# Patient Record
Sex: MALE | Race: WHITE | Employment: OTHER | URBAN - METROPOLITAN AREA
[De-identification: names, ages, dates, MRNs, and addresses within clinical notes are randomized per-mention and may not be internally consistent; named-entity substitution may affect disease eponyms.]

---

## 2017-01-27 RX ORDER — TRAMADOL HYDROCHLORIDE 50 MG/1
50 TABLET ORAL EVERY 6 HOURS PRN
Qty: 120 TABLET | Refills: 1 | OUTPATIENT
Start: 2017-01-27

## 2017-02-14 RX ORDER — TRAMADOL HYDROCHLORIDE 50 MG/1
50 TABLET ORAL EVERY 6 HOURS PRN
Qty: 120 TABLET | Refills: 0 | Status: SHIPPED | OUTPATIENT
Start: 2017-02-14 | End: 2017-04-13 | Stop reason: SDUPTHER

## 2017-04-13 ENCOUNTER — OFFICE VISIT (OUTPATIENT)
Dept: FAMILY MEDICINE CLINIC | Age: 67
End: 2017-04-13

## 2017-04-13 VITALS
WEIGHT: 315 LBS | HEIGHT: 68 IN | TEMPERATURE: 98.4 F | OXYGEN SATURATION: 97 % | BODY MASS INDEX: 47.74 KG/M2 | SYSTOLIC BLOOD PRESSURE: 140 MMHG | DIASTOLIC BLOOD PRESSURE: 82 MMHG | HEART RATE: 79 BPM | RESPIRATION RATE: 18 BRPM

## 2017-04-13 DIAGNOSIS — E66.01 MORBID OBESITY DUE TO EXCESS CALORIES (HCC): ICD-10-CM

## 2017-04-13 DIAGNOSIS — M17.0 PRIMARY OSTEOARTHRITIS OF BOTH KNEES: ICD-10-CM

## 2017-04-13 DIAGNOSIS — J30.2 SEASONAL ALLERGIC RHINITIS, UNSPECIFIED ALLERGIC RHINITIS TRIGGER: ICD-10-CM

## 2017-04-13 DIAGNOSIS — E78.2 MIXED HYPERLIPIDEMIA: ICD-10-CM

## 2017-04-13 DIAGNOSIS — I10 ESSENTIAL HYPERTENSION: Primary | ICD-10-CM

## 2017-04-13 DIAGNOSIS — K58.9 IRRITABLE BOWEL SYNDROME WITHOUT DIARRHEA: ICD-10-CM

## 2017-04-13 DIAGNOSIS — E66.01 MORBID OBESITY WITH BMI OF 50.0-59.9, ADULT (HCC): ICD-10-CM

## 2017-04-13 DIAGNOSIS — D64.9 ANEMIA, UNSPECIFIED TYPE: ICD-10-CM

## 2017-04-13 PROCEDURE — 99214 OFFICE O/P EST MOD 30 MIN: CPT | Performed by: FAMILY MEDICINE

## 2017-04-13 PROCEDURE — 3017F COLORECTAL CA SCREEN DOC REV: CPT | Performed by: FAMILY MEDICINE

## 2017-04-13 PROCEDURE — 1123F ACP DISCUSS/DSCN MKR DOCD: CPT | Performed by: FAMILY MEDICINE

## 2017-04-13 PROCEDURE — 90670 PCV13 VACCINE IM: CPT | Performed by: FAMILY MEDICINE

## 2017-04-13 PROCEDURE — 4040F PNEUMOC VAC/ADMIN/RCVD: CPT | Performed by: FAMILY MEDICINE

## 2017-04-13 PROCEDURE — G0009 ADMIN PNEUMOCOCCAL VACCINE: HCPCS | Performed by: FAMILY MEDICINE

## 2017-04-13 PROCEDURE — G8417 CALC BMI ABV UP PARAM F/U: HCPCS | Performed by: FAMILY MEDICINE

## 2017-04-13 PROCEDURE — 1036F TOBACCO NON-USER: CPT | Performed by: FAMILY MEDICINE

## 2017-04-13 PROCEDURE — G8427 DOCREV CUR MEDS BY ELIG CLIN: HCPCS | Performed by: FAMILY MEDICINE

## 2017-04-13 RX ORDER — CYCLOBENZAPRINE HCL 10 MG
TABLET ORAL
Qty: 180 TABLET | Refills: 1 | Status: SHIPPED | OUTPATIENT
Start: 2017-04-13 | End: 2017-10-19 | Stop reason: SDUPTHER

## 2017-04-13 RX ORDER — IRBESARTAN 150 MG/1
150 TABLET ORAL DAILY
Qty: 90 TABLET | Refills: 3 | Status: SHIPPED | OUTPATIENT
Start: 2017-04-13 | End: 2018-04-12 | Stop reason: SDUPTHER

## 2017-04-13 RX ORDER — TERAZOSIN 10 MG/1
10 CAPSULE ORAL DAILY
Qty: 90 CAPSULE | Refills: 3 | Status: SHIPPED | OUTPATIENT
Start: 2017-04-13 | End: 2018-05-01 | Stop reason: SDUPTHER

## 2017-04-13 RX ORDER — CYCLOBENZAPRINE HCL 10 MG
TABLET ORAL
Qty: 180 TABLET | Refills: 1 | Status: SHIPPED | OUTPATIENT
Start: 2017-04-13 | End: 2017-04-13

## 2017-04-13 RX ORDER — TRAMADOL HYDROCHLORIDE 50 MG/1
50 TABLET ORAL EVERY 6 HOURS PRN
Qty: 120 TABLET | Refills: 0 | Status: SHIPPED | OUTPATIENT
Start: 2017-04-13 | End: 2017-10-19 | Stop reason: SDUPTHER

## 2017-04-13 RX ORDER — TERAZOSIN 10 MG/1
10 CAPSULE ORAL DAILY
Qty: 90 CAPSULE | Refills: 3 | Status: SHIPPED | OUTPATIENT
Start: 2017-04-13 | End: 2017-04-13

## 2017-04-13 ASSESSMENT — PATIENT HEALTH QUESTIONNAIRE - PHQ9
SUM OF ALL RESPONSES TO PHQ QUESTIONS 1-9: 0
1. LITTLE INTEREST OR PLEASURE IN DOING THINGS: 0
2. FEELING DOWN, DEPRESSED OR HOPELESS: 0
SUM OF ALL RESPONSES TO PHQ9 QUESTIONS 1 & 2: 0

## 2017-04-27 ENCOUNTER — CARE COORDINATION (OUTPATIENT)
Dept: CARE COORDINATION | Age: 67
End: 2017-04-27

## 2017-10-09 DIAGNOSIS — E78.2 MIXED HYPERLIPIDEMIA: ICD-10-CM

## 2017-10-09 DIAGNOSIS — I10 ESSENTIAL HYPERTENSION: ICD-10-CM

## 2017-10-09 DIAGNOSIS — D64.9 ANEMIA, UNSPECIFIED TYPE: ICD-10-CM

## 2017-10-09 DIAGNOSIS — I10 ESSENTIAL HYPERTENSION: Primary | ICD-10-CM

## 2017-10-09 LAB
ALBUMIN SERPL-MCNC: 4.2 G/DL (ref 3.9–4.9)
ALP BLD-CCNC: 69 U/L (ref 35–104)
ALT SERPL-CCNC: 17 U/L (ref 0–41)
ANION GAP SERPL CALCULATED.3IONS-SCNC: 19 MEQ/L (ref 7–13)
AST SERPL-CCNC: 17 U/L (ref 0–40)
BASOPHILS ABSOLUTE: 0.1 K/UL (ref 0–0.2)
BASOPHILS RELATIVE PERCENT: 1 %
BILIRUB SERPL-MCNC: 0.5 MG/DL (ref 0–1.2)
BUN BLDV-MCNC: 13 MG/DL (ref 8–23)
CALCIUM SERPL-MCNC: 8.9 MG/DL (ref 8.6–10.2)
CHLORIDE BLD-SCNC: 98 MEQ/L (ref 98–107)
CHOLESTEROL, TOTAL: 179 MG/DL (ref 0–199)
CO2: 24 MEQ/L (ref 22–29)
CREAT SERPL-MCNC: 0.74 MG/DL (ref 0.7–1.2)
EOSINOPHILS ABSOLUTE: 0.5 K/UL (ref 0–0.7)
EOSINOPHILS RELATIVE PERCENT: 8.3 %
GFR AFRICAN AMERICAN: >60
GFR NON-AFRICAN AMERICAN: >60
GLOBULIN: 3.2 G/DL (ref 2.3–3.5)
GLUCOSE BLD-MCNC: 90 MG/DL (ref 74–109)
HCT VFR BLD CALC: 39.9 % (ref 42–52)
HDLC SERPL-MCNC: 36 MG/DL (ref 40–59)
HEMOGLOBIN: 13.1 G/DL (ref 14–18)
LDL CHOLESTEROL CALCULATED: 116 MG/DL (ref 0–129)
LYMPHOCYTES ABSOLUTE: 1.9 K/UL (ref 1–4.8)
LYMPHOCYTES RELATIVE PERCENT: 29 %
MCH RBC QN AUTO: 33.2 PG (ref 27–31.3)
MCHC RBC AUTO-ENTMCNC: 32.7 % (ref 33–37)
MCV RBC AUTO: 101.3 FL (ref 80–100)
MONOCYTES ABSOLUTE: 0.7 K/UL (ref 0.2–0.8)
MONOCYTES RELATIVE PERCENT: 10.2 %
NEUTROPHILS ABSOLUTE: 3.4 K/UL (ref 1.4–6.5)
NEUTROPHILS RELATIVE PERCENT: 51.5 %
PDW BLD-RTO: 14.4 % (ref 11.5–14.5)
PLATELET # BLD: 226 K/UL (ref 130–400)
POTASSIUM SERPL-SCNC: 3.8 MEQ/L (ref 3.5–5.1)
RBC # BLD: 3.94 M/UL (ref 4.7–6.1)
SODIUM BLD-SCNC: 141 MEQ/L (ref 132–144)
TOTAL PROTEIN: 7.4 G/DL (ref 6.4–8.1)
TRIGL SERPL-MCNC: 135 MG/DL (ref 0–200)
WBC # BLD: 6.6 K/UL (ref 4.8–10.8)

## 2017-10-19 ENCOUNTER — OFFICE VISIT (OUTPATIENT)
Dept: FAMILY MEDICINE CLINIC | Age: 67
End: 2017-10-19

## 2017-10-19 VITALS
OXYGEN SATURATION: 97 % | BODY MASS INDEX: 47.74 KG/M2 | HEART RATE: 87 BPM | TEMPERATURE: 97.5 F | WEIGHT: 315 LBS | RESPIRATION RATE: 18 BRPM | DIASTOLIC BLOOD PRESSURE: 70 MMHG | HEIGHT: 68 IN | SYSTOLIC BLOOD PRESSURE: 130 MMHG

## 2017-10-19 DIAGNOSIS — E66.01 MORBID OBESITY DUE TO EXCESS CALORIES (HCC): ICD-10-CM

## 2017-10-19 DIAGNOSIS — K58.9 IRRITABLE BOWEL SYNDROME WITHOUT DIARRHEA: ICD-10-CM

## 2017-10-19 DIAGNOSIS — D64.9 ANEMIA, UNSPECIFIED TYPE: ICD-10-CM

## 2017-10-19 DIAGNOSIS — I10 ESSENTIAL HYPERTENSION: ICD-10-CM

## 2017-10-19 DIAGNOSIS — E78.2 MIXED HYPERLIPIDEMIA: ICD-10-CM

## 2017-10-19 DIAGNOSIS — J30.2 CHRONIC SEASONAL ALLERGIC RHINITIS, UNSPECIFIED TRIGGER: Primary | ICD-10-CM

## 2017-10-19 PROCEDURE — G8417 CALC BMI ABV UP PARAM F/U: HCPCS | Performed by: FAMILY MEDICINE

## 2017-10-19 PROCEDURE — G8484 FLU IMMUNIZE NO ADMIN: HCPCS | Performed by: FAMILY MEDICINE

## 2017-10-19 PROCEDURE — 1123F ACP DISCUSS/DSCN MKR DOCD: CPT | Performed by: FAMILY MEDICINE

## 2017-10-19 PROCEDURE — 90662 IIV NO PRSV INCREASED AG IM: CPT | Performed by: FAMILY MEDICINE

## 2017-10-19 PROCEDURE — G8427 DOCREV CUR MEDS BY ELIG CLIN: HCPCS | Performed by: FAMILY MEDICINE

## 2017-10-19 PROCEDURE — 99214 OFFICE O/P EST MOD 30 MIN: CPT | Performed by: FAMILY MEDICINE

## 2017-10-19 PROCEDURE — 4040F PNEUMOC VAC/ADMIN/RCVD: CPT | Performed by: FAMILY MEDICINE

## 2017-10-19 PROCEDURE — 3017F COLORECTAL CA SCREEN DOC REV: CPT | Performed by: FAMILY MEDICINE

## 2017-10-19 PROCEDURE — 1036F TOBACCO NON-USER: CPT | Performed by: FAMILY MEDICINE

## 2017-10-19 PROCEDURE — G0008 ADMIN INFLUENZA VIRUS VAC: HCPCS | Performed by: FAMILY MEDICINE

## 2017-10-19 RX ORDER — AMOXICILLIN 500 MG/1
1000 TABLET, FILM COATED ORAL 2 TIMES DAILY
Qty: 40 TABLET | Refills: 0 | Status: SHIPPED | OUTPATIENT
Start: 2017-10-19 | End: 2017-10-19

## 2017-10-19 RX ORDER — CYCLOBENZAPRINE HCL 10 MG
TABLET ORAL
Qty: 180 TABLET | Refills: 1 | Status: SHIPPED | OUTPATIENT
Start: 2017-10-19 | End: 2018-05-01 | Stop reason: SDUPTHER

## 2017-10-19 RX ORDER — AMOXICILLIN 500 MG/1
1000 TABLET, FILM COATED ORAL 2 TIMES DAILY
Qty: 40 TABLET | Refills: 0 | Status: SHIPPED | OUTPATIENT
Start: 2017-10-19 | End: 2018-02-22 | Stop reason: ALTCHOICE

## 2017-10-19 RX ORDER — TRAMADOL HYDROCHLORIDE 50 MG/1
50 TABLET ORAL EVERY 6 HOURS PRN
Qty: 120 TABLET | Refills: 0 | Status: SHIPPED | OUTPATIENT
Start: 2017-10-19 | End: 2017-12-10 | Stop reason: SDUPTHER

## 2017-10-19 NOTE — PATIENT INSTRUCTIONS
slowly. Try to make mealtime relaxing. · Find ways to reduce stress. · Get at least 30 minutes of exercise on most days of the week. Exercise can help reduce tension and prevent constipation. Walking is a good choice. You also may want to do other activities, such as running, swimming, cycling, or playing tennis or team sports. When should you call for help? Call your doctor now or seek immediate medical care if:  · Your pain is different than usual or occurs with fever. · You lose weight without trying, or you lose your appetite and you do not know why. · Your symptoms often wake you from sleep. · Your stools are black and tarlike or have streaks of blood. Watch closely for changes in your health, and be sure to contact your doctor if:  · Your IBS symptoms get worse or begin to disrupt your day-to-day life. · You become more tired than usual.  · Your home treatment stops working. Where can you learn more? Go to https://Apokalyyis.PCD Partners. org and sign in to your Meggatel account. Enter P138 in the Photo Rankr box to learn more about \"Irritable Bowel Syndrome: Care Instructions. \"     If you do not have an account, please click on the \"Sign Up Now\" link. Current as of: August 9, 2016  Content Version: 11.3  © 7782-0030 [employer]. Care instructions adapted under license by [facility] ([facility]). If you have questions about a medical condition or this instruction, always ask your healthcare professional. [place] [address] any warranty or liability for your use of this information.

## 2017-10-19 NOTE — PROGRESS NOTES
Vaccine Information Sheet, \"Influenza - Inactivated\" OR \"Live - Intranasal\"  given to [employer]. Patient responses:    Have you ever had a reaction to a flu vaccine? Yes  Are you able to eat eggs without adverse effects? Yes  Do you have any current illness? No  Have you ever had Guillian Alsip Syndrome? No    Flu vaccine given per order. Please see immunization tab.     DR. Nieto 57 A FLU SHOT
bilateral TM's and external ear canals normal  Mouth - mucous membranes moist, pharynx normal without lesions  Neck - supple, no significant adenopathy  Lymphatics - no palpable lymphadenopathy, no hepatosplenomegaly  Chest - clear to auscultation, no wheezes, rales or rhonchi, symmetric air entry  Heart - normal rate, regular rhythm, normal S1, S2, no murmurs, rubs, clicks or gallops  Abdomen - soft, nontender, nondistended, no masses or organomegaly, obese  Neurological - alert, oriented, normal speech, no focal findings or movement disorder noted  Musculoskeletal - no joint tenderness, deformity or swelling  Extremities - peripheral pulses normal, no pedal edema, no clubbing or cyanosis  Skin - normal coloration and turgor, no rashes, no suspicious skin lesions noted;    Assessment:    1. Chronic seasonal allergic rhinitis, unspecified trigger     2. Essential hypertension     3. Morbid obesity due to excess calories ([facility] [address]     4. Irritable bowel syndrome without diarrhea     5. Anemia, unspecified type     6.  Mixed hyperlipidemia         Plan:    Orders Placed This Encounter   Procedures    INFLUENZA, HIGH DOSE, 65 YRS +, IM, PF, PREFILL SYR, 0.5ML (FLUZONE HD)       Outpatient Encounter Prescriptions as of 10/19/2017   Medication Sig Dispense Refill    traMADol (ULTRAM) 50 MG tablet Take 1 tablet by mouth every 6 hours as needed for Pain 120 tablet 0    cyclobenzaprine (FLEXERIL) 10 MG tablet TAKE 1 TABLET EVERY 8 HOURS AS NEEDED FOR MUSCLE SPASMS 180 tablet 1    Amoxicillin 500 MG TABS Take 1,000 mg by mouth 2 times daily 40 tablet 0    irbesartan (AVAPRO) 150 MG tablet Take 1 tablet by mouth daily 90 tablet 3    terazosin (HYTRIN) 10 MG capsule Take 1 capsule by mouth daily 90 capsule 3    [DISCONTINUED] Amoxicillin 500 MG TABS Take 1,000 mg by mouth 2 times daily 40 tablet 0    [DISCONTINUED] traMADol (ULTRAM) 50 MG tablet Take 1 tablet by mouth every 6 hours as needed for Pain 120 tablet 0   
<<-----Click here for Discharge Medication Review

## 2017-12-10 NOTE — TELEPHONE ENCOUNTER
From: Maggie Cruz  Sent: 12/9/2017 5:00 PM EST  Subject: Medication Renewal Request    Idania Cruz would like a refill of the following medications:  traMADol (ULTRAM) 50 MG tablet Jeb Conn MD]    Preferred pharmacy: [address] [place] [address] , [address] [facility] [phone] [facility] [phone]    Comment:

## 2017-12-11 RX ORDER — TRAMADOL HYDROCHLORIDE 50 MG/1
50 TABLET ORAL EVERY 6 HOURS PRN
Qty: 120 TABLET | Refills: 0 | Status: SHIPPED | OUTPATIENT
Start: 2017-12-11 | End: 2018-05-01 | Stop reason: SDUPTHER

## 2018-01-17 ENCOUNTER — TELEPHONE (OUTPATIENT)
Dept: FAMILY MEDICINE CLINIC | Age: 68
End: 2018-01-17

## 2018-01-29 ENCOUNTER — TELEPHONE (OUTPATIENT)
Dept: FAMILY MEDICINE CLINIC | Age: 68
End: 2018-01-29

## 2018-01-29 NOTE — TELEPHONE ENCOUNTER
Received a PA form for Cyclobenzaprine HCL  from [employer]. Completed form and put on doctor's desk for signature. Once singed needs faxed back to insurance company.

## 2018-02-22 ENCOUNTER — OFFICE VISIT (OUTPATIENT)
Dept: FAMILY MEDICINE CLINIC | Age: 68
End: 2018-02-22
Payer: MEDICARE

## 2018-02-22 VITALS
HEIGHT: 68 IN | DIASTOLIC BLOOD PRESSURE: 86 MMHG | HEART RATE: 104 BPM | WEIGHT: 315 LBS | RESPIRATION RATE: 20 BRPM | TEMPERATURE: 97.8 F | BODY MASS INDEX: 47.74 KG/M2 | SYSTOLIC BLOOD PRESSURE: 134 MMHG

## 2018-02-22 DIAGNOSIS — B96.89 ACUTE BACTERIAL SINUSITIS: Primary | ICD-10-CM

## 2018-02-22 DIAGNOSIS — J01.90 ACUTE BACTERIAL SINUSITIS: Primary | ICD-10-CM

## 2018-02-22 DIAGNOSIS — E66.01 MORBID OBESITY WITH BMI OF 60.0-69.9, ADULT (HCC): ICD-10-CM

## 2018-02-22 PROCEDURE — 99213 OFFICE O/P EST LOW 20 MIN: CPT | Performed by: FAMILY MEDICINE

## 2018-02-22 PROCEDURE — 1036F TOBACCO NON-USER: CPT | Performed by: FAMILY MEDICINE

## 2018-02-22 PROCEDURE — 4040F PNEUMOC VAC/ADMIN/RCVD: CPT | Performed by: FAMILY MEDICINE

## 2018-02-22 PROCEDURE — G8417 CALC BMI ABV UP PARAM F/U: HCPCS | Performed by: FAMILY MEDICINE

## 2018-02-22 PROCEDURE — 1123F ACP DISCUSS/DSCN MKR DOCD: CPT | Performed by: FAMILY MEDICINE

## 2018-02-22 PROCEDURE — G8484 FLU IMMUNIZE NO ADMIN: HCPCS | Performed by: FAMILY MEDICINE

## 2018-02-22 PROCEDURE — 3017F COLORECTAL CA SCREEN DOC REV: CPT | Performed by: FAMILY MEDICINE

## 2018-02-22 PROCEDURE — G8427 DOCREV CUR MEDS BY ELIG CLIN: HCPCS | Performed by: FAMILY MEDICINE

## 2018-02-22 RX ORDER — AZITHROMYCIN 250 MG/1
TABLET, FILM COATED ORAL
Qty: 1 PACKET | Refills: 0 | Status: SHIPPED | OUTPATIENT
Start: 2018-02-22 | End: 2018-03-04

## 2018-02-22 NOTE — PATIENT INSTRUCTIONS
solution, insert the tip into your nostril, and squeeze gently. Elliot Beech your nose. When should you call for help? Call your doctor now or seek immediate medical care if:  ? · You are having trouble breathing. ? Watch closely for changes in your health, and be sure to contact your doctor if:  ? · Mucus from your nose gets thicker (like pus) or has new blood in it. ? · You have new or worse symptoms. ? · You do not get better as expected. Where can you learn more? Go to https://[email or web address] [email or web address] Tag.[email or web address] org and sign in to your UpCity account. Enter M030 in the [employer] box to learn more about \"Rhinitis: Care Instructions. \"     If you do not have an account, please click on the \"Sign Up Now\" link. Current as of: May 12, 2017  Content Version: 11.5  © 1625-6795 Healthwise, Incorporated. Care instructions adapted under license by [facility] ([facility]). If you have questions about a medical condition or this instruction, always ask your healthcare professional. [address] any warranty or liability for your use of this information.        Patient Education

## 2018-02-22 NOTE — PROGRESS NOTES
Chief Complaint   Patient presents with    Sinusitis     x6 weeks     Chest Congestion     productive of yellow or clear phlem      2 weeks  head cold and sinus  some cough  no n/v/d  others ill  no fever  Surgical and family history reviewed, medications and allergies noted. Patient denies headache, chest pain, shortness of breath, abdominal pain, nausea, vomiting, fever, weight loss, hematochezia, hematemasis, melena, dysuria, lymphadenopathy, thyromegaly, seizures, rash. OBJECTIVE:  Vitals:    02/22/18 1041   BP: 134/86   Pulse: 104   Resp: 20   Temp: 97.8 °F (36.6 °C)       Vital signs noted. Well appearing in no distress. HEENT  Exam PERRLA, EOMI, TM nl bilaterally, nose clear, throat clear, airway patent, neck supple w/o lymphadenopathy, thyromegaly or meningismus, lungs CTA, CV RRRw/o murmur, gallop or rub, abdomen soft, nontender, positive bowel sounds, w/o mass or hepatosplenomegaly, skin w/o rash, neurologically intact w/o deficit, nl DTR, patient alert and oriented x3, Ext. Full range of motion w/o clubbing, cyanosis or edema, well hydrated. ASSESSMENT:    Encounter Diagnoses   Name Primary?  Acute bacterial sinusitis Yes    Morbid obesity with BMI of 60.0-69.9, adult ([facility])        PLAN:    No orders of the defined types were placed in this encounter. Outpatient Encounter Prescriptions as of 2/22/2018   Medication Sig Dispense Refill    azithromycin (ZITHROMAX) 250 MG tablet Take 2 tabs (500 mg) on Day 1, and take 1 tab (250 mg) on days 2 through 5. 1 packet 0    traMADol (ULTRAM) 50 MG tablet Take 1 tablet by mouth every 6 hours as needed for Pain .  120 tablet 0    cyclobenzaprine (FLEXERIL) 10 MG tablet TAKE 1 TABLET EVERY 8 HOURS AS NEEDED FOR MUSCLE SPASMS 180 tablet 1    irbesartan (AVAPRO) 150 MG tablet Take 1 tablet by mouth daily 90 tablet 3    terazosin (HYTRIN) 10 MG capsule Take 1 capsule by mouth daily 90 capsule 3    [DISCONTINUED] Amoxicillin 500 MG TABS Take 1,000 mg

## 2018-04-16 RX ORDER — IRBESARTAN 150 MG/1
TABLET ORAL
Qty: 90 TABLET | Refills: 3 | Status: SHIPPED | OUTPATIENT
Start: 2018-04-16 | End: 2018-05-15

## 2018-04-26 ENCOUNTER — OFFICE VISIT (OUTPATIENT)
Dept: CARDIOLOGY CLINIC | Age: 68
End: 2018-04-26
Payer: MEDICARE

## 2018-04-26 ENCOUNTER — HOSPITAL ENCOUNTER (OUTPATIENT)
Dept: GENERAL RADIOLOGY | Age: 68
Discharge: HOME | End: 2018-04-28
Payer: MEDICARE

## 2018-04-26 ENCOUNTER — OFFICE VISIT (OUTPATIENT)
Dept: FAMILY MEDICINE CLINIC | Age: 68
End: 2018-04-26
Payer: MEDICARE

## 2018-04-26 VITALS
HEART RATE: 80 BPM | HEIGHT: 68 IN | DIASTOLIC BLOOD PRESSURE: 86 MMHG | SYSTOLIC BLOOD PRESSURE: 138 MMHG | OXYGEN SATURATION: 97 %

## 2018-04-26 VITALS
SYSTOLIC BLOOD PRESSURE: 138 MMHG | OXYGEN SATURATION: 94 % | DIASTOLIC BLOOD PRESSURE: 88 MMHG | HEART RATE: 103 BPM | RESPIRATION RATE: 22 BRPM | TEMPERATURE: 98.4 F

## 2018-04-26 DIAGNOSIS — R06.02 SOB (SHORTNESS OF BREATH): Primary | ICD-10-CM

## 2018-04-26 DIAGNOSIS — E66.01 MORBID OBESITY DUE TO EXCESS CALORIES (HCC): Primary | ICD-10-CM

## 2018-04-26 DIAGNOSIS — R01.1 CARDIAC MURMUR: ICD-10-CM

## 2018-04-26 DIAGNOSIS — I48.19 PERSISTENT ATRIAL FIBRILLATION (HCC): ICD-10-CM

## 2018-04-26 DIAGNOSIS — R01.1 HEART MURMUR: ICD-10-CM

## 2018-04-26 DIAGNOSIS — E78.2 MIXED HYPERLIPIDEMIA: ICD-10-CM

## 2018-04-26 DIAGNOSIS — R06.01 ORTHOPNEA: ICD-10-CM

## 2018-04-26 DIAGNOSIS — J18.9 PNEUMONIA OF RIGHT LOWER LOBE DUE TO INFECTIOUS ORGANISM: ICD-10-CM

## 2018-04-26 DIAGNOSIS — R07.1 CHEST PAIN WITH PAINFUL RESPIRATION: ICD-10-CM

## 2018-04-26 DIAGNOSIS — R06.02 SOB (SHORTNESS OF BREATH): ICD-10-CM

## 2018-04-26 DIAGNOSIS — I10 ESSENTIAL HYPERTENSION: ICD-10-CM

## 2018-04-26 DIAGNOSIS — I48.91 ATRIAL FIBRILLATION, UNSPECIFIED TYPE (HCC): Primary | ICD-10-CM

## 2018-04-26 DIAGNOSIS — I48.91 ATRIAL FIBRILLATION, UNSPECIFIED TYPE (HCC): ICD-10-CM

## 2018-04-26 LAB
ALBUMIN SERPL-MCNC: 4.5 G/DL (ref 3.9–4.9)
ALP BLD-CCNC: 63 U/L (ref 35–104)
ALT SERPL-CCNC: 22 U/L (ref 0–41)
ANION GAP SERPL CALCULATED.3IONS-SCNC: 27 MEQ/L (ref 7–13)
AST SERPL-CCNC: 16 U/L (ref 0–40)
BILIRUB SERPL-MCNC: 0.7 MG/DL (ref 0–1.2)
BUN BLDV-MCNC: 25 MG/DL (ref 8–23)
CALCIUM SERPL-MCNC: 9.1 MG/DL (ref 8.6–10.2)
CHLORIDE BLD-SCNC: 99 MEQ/L (ref 98–107)
CHOLESTEROL, TOTAL: 164 MG/DL (ref 0–199)
CO2: 19 MEQ/L (ref 22–29)
CREAT SERPL-MCNC: 1 MG/DL (ref 0.7–1.2)
GFR AFRICAN AMERICAN: >60
GFR NON-AFRICAN AMERICAN: >60
GLOBULIN: 3.1 G/DL (ref 2.3–3.5)
GLUCOSE BLD-MCNC: 93 MG/DL (ref 74–109)
HDLC SERPL-MCNC: 39 MG/DL (ref 40–59)
INR BLD: 1.2
LDL CHOLESTEROL CALCULATED: 110 MG/DL (ref 0–129)
POTASSIUM SERPL-SCNC: 4.6 MEQ/L (ref 3.5–5.1)
PRO-BNP: 7082 PG/ML
PROTHROMBIN TIME: 12.2 SEC (ref 9.6–12.3)
SODIUM BLD-SCNC: 145 MEQ/L (ref 132–144)
TOTAL PROTEIN: 7.6 G/DL (ref 6.4–8.1)
TRIGL SERPL-MCNC: 77 MG/DL (ref 0–200)
TROPONIN: <0.01 NG/ML (ref 0–0.01)
TSH SERPL DL<=0.05 MIU/L-ACNC: 2.72 UIU/ML (ref 0.27–4.2)

## 2018-04-26 PROCEDURE — 71046 X-RAY EXAM CHEST 2 VIEWS: CPT

## 2018-04-26 PROCEDURE — 1123F ACP DISCUSS/DSCN MKR DOCD: CPT | Performed by: INTERNAL MEDICINE

## 2018-04-26 PROCEDURE — 1036F TOBACCO NON-USER: CPT | Performed by: INTERNAL MEDICINE

## 2018-04-26 PROCEDURE — 1036F TOBACCO NON-USER: CPT | Performed by: NURSE PRACTITIONER

## 2018-04-26 PROCEDURE — 4040F PNEUMOC VAC/ADMIN/RCVD: CPT | Performed by: NURSE PRACTITIONER

## 2018-04-26 PROCEDURE — G8417 CALC BMI ABV UP PARAM F/U: HCPCS | Performed by: NURSE PRACTITIONER

## 2018-04-26 PROCEDURE — 93000 ELECTROCARDIOGRAM COMPLETE: CPT | Performed by: INTERNAL MEDICINE

## 2018-04-26 PROCEDURE — 3017F COLORECTAL CA SCREEN DOC REV: CPT | Performed by: INTERNAL MEDICINE

## 2018-04-26 PROCEDURE — 4040F PNEUMOC VAC/ADMIN/RCVD: CPT | Performed by: INTERNAL MEDICINE

## 2018-04-26 PROCEDURE — 93000 ELECTROCARDIOGRAM COMPLETE: CPT | Performed by: NURSE PRACTITIONER

## 2018-04-26 PROCEDURE — 99205 OFFICE O/P NEW HI 60 MIN: CPT | Performed by: INTERNAL MEDICINE

## 2018-04-26 PROCEDURE — 99213 OFFICE O/P EST LOW 20 MIN: CPT | Performed by: NURSE PRACTITIONER

## 2018-04-26 PROCEDURE — 3017F COLORECTAL CA SCREEN DOC REV: CPT | Performed by: NURSE PRACTITIONER

## 2018-04-26 PROCEDURE — G8427 DOCREV CUR MEDS BY ELIG CLIN: HCPCS | Performed by: INTERNAL MEDICINE

## 2018-04-26 PROCEDURE — 1123F ACP DISCUSS/DSCN MKR DOCD: CPT | Performed by: NURSE PRACTITIONER

## 2018-04-26 PROCEDURE — G8427 DOCREV CUR MEDS BY ELIG CLIN: HCPCS | Performed by: NURSE PRACTITIONER

## 2018-04-26 PROCEDURE — G8417 CALC BMI ABV UP PARAM F/U: HCPCS | Performed by: INTERNAL MEDICINE

## 2018-04-26 RX ORDER — LEVOFLOXACIN 750 MG/1
750 TABLET ORAL DAILY
Qty: 7 TABLET | Refills: 0 | Status: SHIPPED | OUTPATIENT
Start: 2018-04-26 | End: 2018-05-03

## 2018-04-26 RX ORDER — DILTIAZEM HYDROCHLORIDE 180 MG/1
180 CAPSULE, COATED, EXTENDED RELEASE ORAL DAILY
Qty: 30 CAPSULE | Refills: 3 | Status: SHIPPED | OUTPATIENT
Start: 2018-04-26 | End: 2018-09-28 | Stop reason: SDUPTHER

## 2018-04-26 RX ORDER — FUROSEMIDE 40 MG/1
40 TABLET ORAL DAILY
Qty: 30 TABLET | Refills: 0 | Status: SHIPPED | OUTPATIENT
Start: 2018-04-26 | End: 2018-05-23 | Stop reason: SDUPTHER

## 2018-04-26 ASSESSMENT — ENCOUNTER SYMPTOMS
ABDOMINAL DISTENTION: 0
SPUTUM PRODUCTION: 1
ABDOMINAL PAIN: 0
SORE THROAT: 1
COUGH: 1
SHORTNESS OF BREATH: 1
RHINORRHEA: 1

## 2018-05-01 ENCOUNTER — OFFICE VISIT (OUTPATIENT)
Dept: FAMILY MEDICINE CLINIC | Age: 68
End: 2018-05-01
Payer: MEDICARE

## 2018-05-01 VITALS
DIASTOLIC BLOOD PRESSURE: 84 MMHG | RESPIRATION RATE: 20 BRPM | HEART RATE: 76 BPM | HEIGHT: 68 IN | WEIGHT: 315 LBS | BODY MASS INDEX: 47.74 KG/M2 | SYSTOLIC BLOOD PRESSURE: 120 MMHG | TEMPERATURE: 97.1 F

## 2018-05-01 DIAGNOSIS — M15.9 PRIMARY OSTEOARTHRITIS INVOLVING MULTIPLE JOINTS: ICD-10-CM

## 2018-05-01 DIAGNOSIS — E78.2 MIXED HYPERLIPIDEMIA: ICD-10-CM

## 2018-05-01 DIAGNOSIS — R01.1 CARDIAC MURMUR: ICD-10-CM

## 2018-05-01 DIAGNOSIS — Z12.5 PROSTATE CANCER SCREENING: ICD-10-CM

## 2018-05-01 DIAGNOSIS — I10 ESSENTIAL HYPERTENSION: ICD-10-CM

## 2018-05-01 DIAGNOSIS — K58.9 IRRITABLE BOWEL SYNDROME WITHOUT DIARRHEA: ICD-10-CM

## 2018-05-01 DIAGNOSIS — I48.19 PERSISTENT ATRIAL FIBRILLATION (HCC): Primary | ICD-10-CM

## 2018-05-01 DIAGNOSIS — E66.01 MORBID OBESITY DUE TO EXCESS CALORIES (HCC): ICD-10-CM

## 2018-05-01 PROBLEM — M15.0 PRIMARY OSTEOARTHRITIS INVOLVING MULTIPLE JOINTS: Status: ACTIVE | Noted: 2018-05-01

## 2018-05-01 PROCEDURE — 1123F ACP DISCUSS/DSCN MKR DOCD: CPT | Performed by: FAMILY MEDICINE

## 2018-05-01 PROCEDURE — 3017F COLORECTAL CA SCREEN DOC REV: CPT | Performed by: FAMILY MEDICINE

## 2018-05-01 PROCEDURE — 4040F PNEUMOC VAC/ADMIN/RCVD: CPT | Performed by: FAMILY MEDICINE

## 2018-05-01 PROCEDURE — G8417 CALC BMI ABV UP PARAM F/U: HCPCS | Performed by: FAMILY MEDICINE

## 2018-05-01 PROCEDURE — 3288F FALL RISK ASSESSMENT DOCD: CPT | Performed by: FAMILY MEDICINE

## 2018-05-01 PROCEDURE — G8510 SCR DEP NEG, NO PLAN REQD: HCPCS | Performed by: FAMILY MEDICINE

## 2018-05-01 PROCEDURE — G8427 DOCREV CUR MEDS BY ELIG CLIN: HCPCS | Performed by: FAMILY MEDICINE

## 2018-05-01 PROCEDURE — 1036F TOBACCO NON-USER: CPT | Performed by: FAMILY MEDICINE

## 2018-05-01 PROCEDURE — 99214 OFFICE O/P EST MOD 30 MIN: CPT | Performed by: FAMILY MEDICINE

## 2018-05-01 RX ORDER — TRIAMCINOLONE ACETONIDE 1 MG/G
CREAM TOPICAL
Qty: 45 G | Refills: 1 | Status: SHIPPED | OUTPATIENT
Start: 2018-05-01

## 2018-05-01 RX ORDER — CYCLOBENZAPRINE HCL 10 MG
TABLET ORAL
Qty: 180 TABLET | Refills: 1 | Status: SHIPPED | OUTPATIENT
Start: 2018-05-01 | End: 2018-12-06 | Stop reason: SDUPTHER

## 2018-05-01 RX ORDER — TERAZOSIN 10 MG/1
10 CAPSULE ORAL DAILY
Qty: 90 CAPSULE | Refills: 3 | Status: SHIPPED | OUTPATIENT
Start: 2018-05-01 | End: 2019-03-21 | Stop reason: SDUPTHER

## 2018-05-01 RX ORDER — TRAMADOL HYDROCHLORIDE 50 MG/1
50 TABLET ORAL EVERY 6 HOURS PRN
Qty: 120 TABLET | Refills: 0 | Status: SHIPPED | OUTPATIENT
Start: 2018-05-01 | End: 2018-08-08 | Stop reason: SDUPTHER

## 2018-05-01 ASSESSMENT — PATIENT HEALTH QUESTIONNAIRE - PHQ9
1. LITTLE INTEREST OR PLEASURE IN DOING THINGS: 0
SUM OF ALL RESPONSES TO PHQ QUESTIONS 1-9: 0
SUM OF ALL RESPONSES TO PHQ9 QUESTIONS 1 & 2: 0
2. FEELING DOWN, DEPRESSED OR HOPELESS: 0

## 2018-05-02 ENCOUNTER — HOSPITAL ENCOUNTER (OUTPATIENT)
Dept: NON INVASIVE DIAGNOSTICS | Age: 68
Discharge: HOME | End: 2018-05-02
Payer: MEDICARE

## 2018-05-02 DIAGNOSIS — R01.1 HEART MURMUR: ICD-10-CM

## 2018-05-02 DIAGNOSIS — R06.02 SOB (SHORTNESS OF BREATH): ICD-10-CM

## 2018-05-02 DIAGNOSIS — I48.91 ATRIAL FIBRILLATION, UNSPECIFIED TYPE (HCC): ICD-10-CM

## 2018-05-02 LAB
LV EF: 45 %
LVEF MODALITY: NORMAL

## 2018-05-02 PROCEDURE — 93306 TTE W/DOPPLER COMPLETE: CPT

## 2018-05-03 ENCOUNTER — OFFICE VISIT (OUTPATIENT)
Dept: CARDIOLOGY CLINIC | Age: 68
End: 2018-05-03
Payer: MEDICARE

## 2018-05-03 VITALS
SYSTOLIC BLOOD PRESSURE: 134 MMHG | OXYGEN SATURATION: 97 % | HEART RATE: 53 BPM | DIASTOLIC BLOOD PRESSURE: 82 MMHG | TEMPERATURE: 97.1 F

## 2018-05-03 DIAGNOSIS — E66.01 MORBID OBESITY DUE TO EXCESS CALORIES (HCC): ICD-10-CM

## 2018-05-03 DIAGNOSIS — I48.19 PERSISTENT ATRIAL FIBRILLATION (HCC): Primary | ICD-10-CM

## 2018-05-03 DIAGNOSIS — E78.2 MIXED HYPERLIPIDEMIA: ICD-10-CM

## 2018-05-03 DIAGNOSIS — I35.0 SEVERE AORTIC STENOSIS: ICD-10-CM

## 2018-05-03 DIAGNOSIS — I10 ESSENTIAL HYPERTENSION: ICD-10-CM

## 2018-05-03 PROCEDURE — 4040F PNEUMOC VAC/ADMIN/RCVD: CPT | Performed by: INTERNAL MEDICINE

## 2018-05-03 PROCEDURE — 1123F ACP DISCUSS/DSCN MKR DOCD: CPT | Performed by: INTERNAL MEDICINE

## 2018-05-03 PROCEDURE — G8417 CALC BMI ABV UP PARAM F/U: HCPCS | Performed by: INTERNAL MEDICINE

## 2018-05-03 PROCEDURE — G8427 DOCREV CUR MEDS BY ELIG CLIN: HCPCS | Performed by: INTERNAL MEDICINE

## 2018-05-03 PROCEDURE — 3017F COLORECTAL CA SCREEN DOC REV: CPT | Performed by: INTERNAL MEDICINE

## 2018-05-03 PROCEDURE — 99215 OFFICE O/P EST HI 40 MIN: CPT | Performed by: INTERNAL MEDICINE

## 2018-05-03 PROCEDURE — 93000 ELECTROCARDIOGRAM COMPLETE: CPT | Performed by: INTERNAL MEDICINE

## 2018-05-03 PROCEDURE — 1036F TOBACCO NON-USER: CPT | Performed by: INTERNAL MEDICINE

## 2018-05-07 ENCOUNTER — HOSPITAL ENCOUNTER (OUTPATIENT)
Dept: CARDIAC CATH/INVASIVE PROCEDURES | Age: 68
Discharge: HOME | End: 2018-05-07
Attending: INTERNAL MEDICINE | Admitting: INTERNAL MEDICINE
Payer: MEDICARE

## 2018-05-07 ENCOUNTER — ANESTHESIA EVENT (OUTPATIENT)
Dept: CARDIAC CATH/INVASIVE PROCEDURES | Age: 68
End: 2018-05-07
Payer: MEDICARE

## 2018-05-07 ENCOUNTER — ANESTHESIA (OUTPATIENT)
Dept: CARDIAC CATH/INVASIVE PROCEDURES | Age: 68
End: 2018-05-07
Payer: MEDICARE

## 2018-05-07 VITALS
BODY MASS INDEX: 47.74 KG/M2 | TEMPERATURE: 97 F | DIASTOLIC BLOOD PRESSURE: 64 MMHG | WEIGHT: 315 LBS | HEART RATE: 75 BPM | SYSTOLIC BLOOD PRESSURE: 135 MMHG | OXYGEN SATURATION: 95 % | RESPIRATION RATE: 21 BRPM | HEIGHT: 68 IN

## 2018-05-07 VITALS
OXYGEN SATURATION: 99 % | RESPIRATION RATE: 19 BRPM | SYSTOLIC BLOOD PRESSURE: 105 MMHG | DIASTOLIC BLOOD PRESSURE: 51 MMHG

## 2018-05-07 DIAGNOSIS — I35.0 SEVERE AORTIC STENOSIS: ICD-10-CM

## 2018-05-07 DIAGNOSIS — I48.19 PERSISTENT ATRIAL FIBRILLATION (HCC): ICD-10-CM

## 2018-05-07 LAB
EKG ATRIAL RATE: 104 BPM
EKG ATRIAL RATE: 69 BPM
EKG Q-T INTERVAL: 434 MS
EKG Q-T INTERVAL: 452 MS
EKG QRS DURATION: 104 MS
EKG QRS DURATION: 104 MS
EKG QTC CALCULATION (BAZETT): 484 MS
EKG QTC CALCULATION (BAZETT): 491 MS
EKG R AXIS: 25 DEGREES
EKG R AXIS: 25 DEGREES
EKG T AXIS: -59 DEGREES
EKG T AXIS: 26 DEGREES
EKG VENTRICULAR RATE: 69 BPM
EKG VENTRICULAR RATE: 77 BPM
HCT VFR BLD CALC: 39.4 % (ref 42–52)
HEMOGLOBIN: 13.1 G/DL (ref 14–18)
LV EF: 38 %
LVEF MODALITY: NORMAL
MCH RBC QN AUTO: 32.9 PG (ref 27–31.3)
MCHC RBC AUTO-ENTMCNC: 33.1 % (ref 33–37)
MCV RBC AUTO: 99.2 FL (ref 80–100)
PDW BLD-RTO: 15.5 % (ref 11.5–14.5)
PLATELET # BLD: 177 K/UL (ref 130–400)
RBC # BLD: 3.97 M/UL (ref 4.7–6.1)
WBC # BLD: 5.2 K/UL (ref 4.8–10.8)

## 2018-05-07 PROCEDURE — 6360000002 HC RX W HCPCS

## 2018-05-07 PROCEDURE — C1725 CATH, TRANSLUMIN NON-LASER: HCPCS

## 2018-05-07 PROCEDURE — 6360000002 HC RX W HCPCS: Performed by: INTERNAL MEDICINE

## 2018-05-07 PROCEDURE — 93312 ECHO TRANSESOPHAGEAL: CPT

## 2018-05-07 PROCEDURE — 93325 DOPPLER ECHO COLOR FLOW MAPG: CPT

## 2018-05-07 PROCEDURE — 93321 DOPPLER ECHO F-UP/LMTD STD: CPT

## 2018-05-07 PROCEDURE — 2500000003 HC RX 250 WO HCPCS

## 2018-05-07 PROCEDURE — 2720000010 HC SURG SUPPLY STERILE

## 2018-05-07 PROCEDURE — 93454 CORONARY ARTERY ANGIO S&I: CPT | Performed by: INTERNAL MEDICINE

## 2018-05-07 PROCEDURE — 2500000003 HC RX 250 WO HCPCS: Performed by: NURSE ANESTHETIST, CERTIFIED REGISTERED

## 2018-05-07 PROCEDURE — 6360000002 HC RX W HCPCS: Performed by: NURSE ANESTHETIST, CERTIFIED REGISTERED

## 2018-05-07 PROCEDURE — 85027 COMPLETE CBC AUTOMATED: CPT

## 2018-05-07 PROCEDURE — 3700000000 HC ANESTHESIA ATTENDED CARE

## 2018-05-07 PROCEDURE — 3700000001 HC ADD 15 MINUTES (ANESTHESIA)

## 2018-05-07 PROCEDURE — 2580000003 HC RX 258: Performed by: INTERNAL MEDICINE

## 2018-05-07 PROCEDURE — 92960 CARDIOVERSION ELECTRIC EXT: CPT | Performed by: INTERNAL MEDICINE

## 2018-05-07 PROCEDURE — C1894 INTRO/SHEATH, NON-LASER: HCPCS

## 2018-05-07 PROCEDURE — 93312 ECHO TRANSESOPHAGEAL: CPT | Performed by: INTERNAL MEDICINE

## 2018-05-07 PROCEDURE — 93458 L HRT ARTERY/VENTRICLE ANGIO: CPT | Performed by: INTERNAL MEDICINE

## 2018-05-07 PROCEDURE — 93005 ELECTROCARDIOGRAM TRACING: CPT

## 2018-05-07 PROCEDURE — 6370000000 HC RX 637 (ALT 250 FOR IP): Performed by: INTERNAL MEDICINE

## 2018-05-07 PROCEDURE — C1769 GUIDE WIRE: HCPCS

## 2018-05-07 PROCEDURE — 2580000003 HC RX 258

## 2018-05-07 RX ORDER — BACTERIOSTATIC SODIUM CHLORIDE 0.9 %
VIAL (ML) INJECTION
Status: DISCONTINUED
Start: 2018-05-07 | End: 2018-05-07 | Stop reason: HOSPADM

## 2018-05-07 RX ORDER — SODIUM CHLORIDE 0.9 % (FLUSH) 0.9 %
10 SYRINGE (ML) INJECTION PRN
Status: DISCONTINUED | OUTPATIENT
Start: 2018-05-07 | End: 2018-05-07 | Stop reason: HOSPADM

## 2018-05-07 RX ORDER — CARVEDILOL 3.12 MG/1
3.12 TABLET ORAL 2 TIMES DAILY
Qty: 60 TABLET | Refills: 3 | Status: SHIPPED | OUTPATIENT
Start: 2018-05-07 | End: 2018-05-15 | Stop reason: SDUPTHER

## 2018-05-07 RX ORDER — ACETAMINOPHEN 325 MG/1
650 TABLET ORAL EVERY 4 HOURS PRN
Status: DISCONTINUED | OUTPATIENT
Start: 2018-05-07 | End: 2018-05-07 | Stop reason: HOSPADM

## 2018-05-07 RX ORDER — SODIUM CHLORIDE 9 MG/ML
INJECTION, SOLUTION INTRAVENOUS CONTINUOUS
Status: DISCONTINUED | OUTPATIENT
Start: 2018-05-07 | End: 2018-05-07 | Stop reason: HOSPADM

## 2018-05-07 RX ORDER — ONDANSETRON 2 MG/ML
4 INJECTION INTRAMUSCULAR; INTRAVENOUS EVERY 6 HOURS PRN
Status: DISCONTINUED | OUTPATIENT
Start: 2018-05-07 | End: 2018-05-07 | Stop reason: HOSPADM

## 2018-05-07 RX ORDER — HYDRALAZINE HYDROCHLORIDE 20 MG/ML
10 INJECTION INTRAMUSCULAR; INTRAVENOUS EVERY 10 MIN PRN
Status: DISCONTINUED | OUTPATIENT
Start: 2018-05-07 | End: 2018-05-07 | Stop reason: HOSPADM

## 2018-05-07 RX ORDER — PROPOFOL 10 MG/ML
INJECTION, EMULSION INTRAVENOUS PRN
Status: DISCONTINUED | OUTPATIENT
Start: 2018-05-07 | End: 2018-05-07 | Stop reason: SDUPTHER

## 2018-05-07 RX ORDER — LIDOCAINE HYDROCHLORIDE 10 MG/ML
INJECTION, SOLUTION EPIDURAL; INFILTRATION; INTRACAUDAL; PERINEURAL PRN
Status: DISCONTINUED | OUTPATIENT
Start: 2018-05-07 | End: 2018-05-07 | Stop reason: SDUPTHER

## 2018-05-07 RX ORDER — RANOLAZINE 500 MG/1
500 TABLET, EXTENDED RELEASE ORAL ONCE
Status: COMPLETED | OUTPATIENT
Start: 2018-05-07 | End: 2018-05-07

## 2018-05-07 RX ORDER — AMIODARONE HYDROCHLORIDE 200 MG/1
200 TABLET ORAL 2 TIMES DAILY
Qty: 60 TABLET | Refills: 6 | Status: SHIPPED | OUTPATIENT
Start: 2018-05-07 | End: 2018-07-10 | Stop reason: ALTCHOICE

## 2018-05-07 RX ORDER — SODIUM CHLORIDE 0.9 % (FLUSH) 0.9 %
10 SYRINGE (ML) INJECTION EVERY 12 HOURS SCHEDULED
Status: DISCONTINUED | OUTPATIENT
Start: 2018-05-07 | End: 2018-05-07 | Stop reason: HOSPADM

## 2018-05-07 RX ADMIN — RANOLAZINE 500 MG: 500 TABLET, FILM COATED, EXTENDED RELEASE ORAL at 10:17

## 2018-05-07 RX ADMIN — DEXTROSE MONOHYDRATE 300 MG: 50 INJECTION, SOLUTION INTRAVENOUS at 14:09

## 2018-05-07 RX ADMIN — PROPOFOL 50 MG: 10 INJECTION, EMULSION INTRAVENOUS at 12:49

## 2018-05-07 RX ADMIN — PHENYLEPHRINE HYDROCHLORIDE 100 MCG: 10 INJECTION INTRAVENOUS at 12:54

## 2018-05-07 RX ADMIN — PROPOFOL 200 MG: 10 INJECTION, EMULSION INTRAVENOUS at 12:39

## 2018-05-07 RX ADMIN — PHENYLEPHRINE HYDROCHLORIDE 100 MCG: 10 INJECTION INTRAVENOUS at 12:43

## 2018-05-07 RX ADMIN — LIDOCAINE HYDROCHLORIDE 50 MG: 10 INJECTION, SOLUTION EPIDURAL; INFILTRATION; INTRACAUDAL at 12:38

## 2018-05-07 RX ADMIN — PHENYLEPHRINE HYDROCHLORIDE 50 MCG: 10 INJECTION INTRAVENOUS at 12:55

## 2018-05-07 RX ADMIN — SODIUM CHLORIDE: 9 INJECTION, SOLUTION INTRAVENOUS at 10:00

## 2018-05-07 RX ADMIN — PHENYLEPHRINE HYDROCHLORIDE 150 MCG: 10 INJECTION INTRAVENOUS at 13:00

## 2018-05-10 ENCOUNTER — TELEPHONE (OUTPATIENT)
Dept: CARDIOLOGY CLINIC | Age: 68
End: 2018-05-10

## 2018-05-10 DIAGNOSIS — I35.0 SEVERE AORTIC STENOSIS: Primary | ICD-10-CM

## 2018-05-15 ENCOUNTER — OFFICE VISIT (OUTPATIENT)
Dept: CARDIOLOGY CLINIC | Age: 68
End: 2018-05-15
Payer: MEDICARE

## 2018-05-15 VITALS
DIASTOLIC BLOOD PRESSURE: 86 MMHG | SYSTOLIC BLOOD PRESSURE: 134 MMHG | TEMPERATURE: 97.1 F | RESPIRATION RATE: 24 BRPM | OXYGEN SATURATION: 97 % | HEART RATE: 102 BPM | HEIGHT: 68 IN | WEIGHT: 315 LBS | BODY MASS INDEX: 47.74 KG/M2

## 2018-05-15 DIAGNOSIS — I48.19 PERSISTENT ATRIAL FIBRILLATION (HCC): Primary | ICD-10-CM

## 2018-05-15 DIAGNOSIS — E78.2 MIXED HYPERLIPIDEMIA: ICD-10-CM

## 2018-05-15 DIAGNOSIS — E66.01 MORBID OBESITY DUE TO EXCESS CALORIES (HCC): ICD-10-CM

## 2018-05-15 DIAGNOSIS — I10 ESSENTIAL HYPERTENSION: ICD-10-CM

## 2018-05-15 PROCEDURE — G8417 CALC BMI ABV UP PARAM F/U: HCPCS | Performed by: INTERNAL MEDICINE

## 2018-05-15 PROCEDURE — 4040F PNEUMOC VAC/ADMIN/RCVD: CPT | Performed by: INTERNAL MEDICINE

## 2018-05-15 PROCEDURE — 1123F ACP DISCUSS/DSCN MKR DOCD: CPT | Performed by: INTERNAL MEDICINE

## 2018-05-15 PROCEDURE — 3017F COLORECTAL CA SCREEN DOC REV: CPT | Performed by: INTERNAL MEDICINE

## 2018-05-15 PROCEDURE — G8427 DOCREV CUR MEDS BY ELIG CLIN: HCPCS | Performed by: INTERNAL MEDICINE

## 2018-05-15 PROCEDURE — 99214 OFFICE O/P EST MOD 30 MIN: CPT | Performed by: INTERNAL MEDICINE

## 2018-05-15 PROCEDURE — 1036F TOBACCO NON-USER: CPT | Performed by: INTERNAL MEDICINE

## 2018-05-15 PROCEDURE — 93000 ELECTROCARDIOGRAM COMPLETE: CPT | Performed by: INTERNAL MEDICINE

## 2018-05-15 RX ORDER — CARVEDILOL 6.25 MG/1
6.25 TABLET ORAL 2 TIMES DAILY
Qty: 60 TABLET | Refills: 6 | Status: SHIPPED | OUTPATIENT
Start: 2018-05-15 | End: 2018-07-10 | Stop reason: ALTCHOICE

## 2018-05-23 LAB
HCT VFR BLD CALC: 40.7 % (ref 42–52)
HEMOGLOBIN: 13.3 G/DL (ref 14–18)
MCH RBC QN AUTO: 32.7 PG (ref 27–31.3)
MCHC RBC AUTO-ENTMCNC: 32.8 % (ref 33–37)
MCV RBC AUTO: 99.9 FL (ref 80–100)
PDW BLD-RTO: 15.1 % (ref 11.5–14.5)
PLATELET # BLD: 206 K/UL (ref 130–400)
RBC # BLD: 4.07 M/UL (ref 4.7–6.1)
WBC # BLD: 7.2 K/UL (ref 4.8–10.8)

## 2018-05-24 RX ORDER — FUROSEMIDE 40 MG/1
TABLET ORAL
Qty: 30 TABLET | Refills: 0 | Status: SHIPPED | OUTPATIENT
Start: 2018-05-24 | End: 2018-07-10 | Stop reason: SDUPTHER

## 2018-06-26 ENCOUNTER — TELEPHONE (OUTPATIENT)
Dept: FAMILY MEDICINE CLINIC | Age: 68
End: 2018-06-26

## 2018-06-26 ENCOUNTER — NURSE ONLY (OUTPATIENT)
Dept: FAMILY MEDICINE CLINIC | Age: 68
End: 2018-06-26
Payer: MEDICARE

## 2018-06-26 DIAGNOSIS — N39.0 URINARY TRACT INFECTION WITH HEMATURIA, SITE UNSPECIFIED: ICD-10-CM

## 2018-06-26 DIAGNOSIS — R31.9 URINARY TRACT INFECTION WITH HEMATURIA, SITE UNSPECIFIED: ICD-10-CM

## 2018-06-26 DIAGNOSIS — R31.9 URINARY TRACT INFECTION WITH HEMATURIA, SITE UNSPECIFIED: Primary | ICD-10-CM

## 2018-06-26 DIAGNOSIS — N39.0 URINARY TRACT INFECTION WITH HEMATURIA, SITE UNSPECIFIED: Primary | ICD-10-CM

## 2018-06-26 LAB
BILIRUBIN, POC: NORMAL
BLOOD URINE, POC: NORMAL
CLARITY, POC: NORMAL
COLOR, POC: NORMAL
GLUCOSE URINE, POC: NORMAL
KETONES, POC: NORMAL
LEUKOCYTE EST, POC: NORMAL
NITRITE, POC: NORMAL
PH, POC: 7
PROTEIN, POC: NORMAL
SPECIFIC GRAVITY, POC: 1.02
UROBILINOGEN, POC: 3.5

## 2018-06-26 PROCEDURE — 81003 URINALYSIS AUTO W/O SCOPE: CPT | Performed by: FAMILY MEDICINE

## 2018-06-26 RX ORDER — CEPHALEXIN 500 MG/1
500 CAPSULE ORAL 3 TIMES DAILY
Qty: 30 CAPSULE | Refills: 0 | Status: SHIPPED | OUTPATIENT
Start: 2018-06-26 | End: 2018-07-10 | Stop reason: ALTCHOICE

## 2018-06-29 LAB
ORGANISM: ABNORMAL
URINE CULTURE, ROUTINE: ABNORMAL
URINE CULTURE, ROUTINE: ABNORMAL

## 2018-07-10 ENCOUNTER — OFFICE VISIT (OUTPATIENT)
Dept: CARDIOLOGY CLINIC | Age: 68
End: 2018-07-10
Payer: MEDICARE

## 2018-07-10 VITALS — HEIGHT: 68 IN | HEART RATE: 90 BPM | DIASTOLIC BLOOD PRESSURE: 86 MMHG | SYSTOLIC BLOOD PRESSURE: 140 MMHG

## 2018-07-10 DIAGNOSIS — E66.01 MORBID OBESITY DUE TO EXCESS CALORIES (HCC): ICD-10-CM

## 2018-07-10 DIAGNOSIS — Z95.2 S/P TAVR (TRANSCATHETER AORTIC VALVE REPLACEMENT): ICD-10-CM

## 2018-07-10 DIAGNOSIS — Z86.79 HISTORY OF AORTIC STENOSIS: ICD-10-CM

## 2018-07-10 DIAGNOSIS — N39.0 URINARY TRACT INFECTION WITH HEMATURIA, SITE UNSPECIFIED: ICD-10-CM

## 2018-07-10 DIAGNOSIS — I10 ESSENTIAL HYPERTENSION: ICD-10-CM

## 2018-07-10 DIAGNOSIS — R31.9 URINARY TRACT INFECTION WITH HEMATURIA, SITE UNSPECIFIED: ICD-10-CM

## 2018-07-10 DIAGNOSIS — I48.19 PERSISTENT ATRIAL FIBRILLATION (HCC): Primary | ICD-10-CM

## 2018-07-10 DIAGNOSIS — E78.2 MIXED HYPERLIPIDEMIA: ICD-10-CM

## 2018-07-10 PROCEDURE — 99214 OFFICE O/P EST MOD 30 MIN: CPT | Performed by: INTERNAL MEDICINE

## 2018-07-10 PROCEDURE — 3017F COLORECTAL CA SCREEN DOC REV: CPT | Performed by: INTERNAL MEDICINE

## 2018-07-10 PROCEDURE — 1123F ACP DISCUSS/DSCN MKR DOCD: CPT | Performed by: INTERNAL MEDICINE

## 2018-07-10 PROCEDURE — 4040F PNEUMOC VAC/ADMIN/RCVD: CPT | Performed by: INTERNAL MEDICINE

## 2018-07-10 PROCEDURE — 1036F TOBACCO NON-USER: CPT | Performed by: INTERNAL MEDICINE

## 2018-07-10 PROCEDURE — G8427 DOCREV CUR MEDS BY ELIG CLIN: HCPCS | Performed by: INTERNAL MEDICINE

## 2018-07-10 PROCEDURE — 93000 ELECTROCARDIOGRAM COMPLETE: CPT | Performed by: INTERNAL MEDICINE

## 2018-07-10 PROCEDURE — G8417 CALC BMI ABV UP PARAM F/U: HCPCS | Performed by: INTERNAL MEDICINE

## 2018-07-10 RX ORDER — FUROSEMIDE 40 MG/1
TABLET ORAL
Qty: 30 TABLET | Refills: 3 | Status: SHIPPED | OUTPATIENT
Start: 2018-07-10 | End: 2018-10-25 | Stop reason: SDUPTHER

## 2018-07-10 RX ORDER — CLOPIDOGREL BISULFATE 75 MG/1
75 TABLET ORAL DAILY
COMMUNITY
End: 2018-08-23 | Stop reason: ALTCHOICE

## 2018-07-10 NOTE — PROGRESS NOTES
Chief Complaint   Patient presents with    Atrial Fibrillation     DR BLOUNT THE SURGEON TOOK HIM OFF OF [address] AND AMIODARONE        4-26-18: Patient presents for initial medical evaluation. Patient is followed on a regular basis by Dr. Joanna Opitz, MD. Has been dealing with URI/bronchitis for the past week or so. Feeling tired, SOB. EKG today shows afibb at 111bpm. Does have orthopnea as well. No previous hx of afibb. No hx of MI, CHF or arrhythmias. No hx of LHC. Last stress test was 10 yrs ago. Pt denies chest pain,   nausea, vomiting, diarrhea, constipation, motor weakness, insomnia, weight loss, syncope, dizziness, lightheadedness, palpitations, PND, orthopnea, or claudication. + cough and nasal congestion. 5-3-18: feeling better overall, still SOB with mild exertion but much improved from last office visit. . s/p CXR with cardiomegaly and right pleural effusion. s/p ECHO with EF of 45%, mod LAE, Severe AS with Vmax of 4.3m/s, Peak of 74mmHg, mean of 38mmHg, AMARILYS 0.84cm2, RVSP of 35mmHg. BNP was 7000, lipid profile is ok, trop was negative. Renal function is normal. Diuresed well with lasix. HR and BP are controlled now. EKG with Afibb at 85bpm today. Pt denies chest pain,   nausea, vomiting, diarrhea, constipation, motor weakness, insomnia, weight loss, syncope, dizziness, lightheadedness, palpitations, PND, orthopnea. No LE discoloration or ulcers. + b/l  LE edema. Lipid profile is normal. No recent hospitalization. 5-15-18: s/p LHC with mild CAD, valve not able to be crossed but did not try hard to cross. s/p EMMANUEL with severe AS, EF of 35-40%, mild MR, mild AI, mild PHTN. s/p unsuccessful CV . EKG today with afibb at 84bpm.   Pt denies chest pain, dyspnea, dyspnea on exertion, change in exercise capacity,  nausea, vomiting, diarrhea, constipation, motor weakness, insomnia, weight loss, syncope, dizziness, lightheadedness, palpitations, PND, orthopnea, or claudication.  He feels very tired and sleeps all the time. he has an appointment with [facility] for AV TAVR or surgery. On Eliquis and no bleeding issues. On amiodarone as well.     7-10-18: S/P TAVR AT  WITH KIRAN. WAS PLACED ON ASA/PLAVIX FOR ONE MONTH AND HE IS TO RESUME ELIQUIS AFTER. COREG WAS STOPPED AND PLACED ON CARDIZEM. HE IS NOT ON AMIODARONE. Pt denies chest pain, dyspnea, dyspnea on exertion, change in exercise capacity, fatigue,  nausea, vomiting, diarrhea, constipation, motor weakness, insomnia, weight loss, syncope, dizziness, lightheadedness, palpitations, PND, orthopnea, or claudication. NO ISSUES WITH THE PROCEDURE OTHER THAN UTI. No nitro use. BP and hr are good. CAD is stable. No LE discoloration or ulcers. No LE edema. No CHF type symptoms. Lipid profile is normal. ekg TODAY WITH AFIBB AT 90BPM.       Patient Active Problem List   Diagnosis    Colon polyps    Seasonal allergies    Essential hypertension    Morbid obesity due to excess calories ([address]    Irritable bowel syndrome without diarrhea    Anemia    Mixed hyperlipidemia    Persistent atrial fibrillation (HCC)    Cardiac murmur    Primary osteoarthritis involving multiple joints    S/P TAVR (transcatheter aortic valve replacement)    History of aortic stenosis       Past Surgical History:   Procedure Laterality Date    AORTIC VALVE SURGERY  [id number]      [address]    CHOLECYSTECTOMY  03/2010    COLONOSCOPY  2009    KNEE SURGERY  1992       Social History     Social History    Marital status:      Spouse name: N/A    Number of children: N/A    Years of education: N/A     Social History Main Topics    Smoking status: Never Smoker    Smokeless tobacco: Never Used    Alcohol use No    Drug use: No    Sexual activity: Not on file     Other Topics Concern    Not on file     Social History Narrative    No narrative on file       Family History   Problem Relation Age of Onset    Other Mother         BRAIN TUMOR       Current Outpatient Prescriptions   Medication Sig Dispense Refill    clopidogrel (PLAVIX) 75 MG tablet Take 75 mg by mouth daily      aspirin 81 MG tablet Take 81 mg by mouth daily      furosemide (LASIX) 40 MG tablet take 1 tablet by mouth once daily 30 tablet 0    terazosin (HYTRIN) 10 MG capsule Take 1 capsule by mouth daily 90 capsule 3    cyclobenzaprine (FLEXERIL) 10 MG tablet TAKE 1 TABLET EVERY 8 HOURS AS NEEDED FOR MUSCLE SPASMS 180 tablet 1    triamcinolone (KENALOG) 0.1 % cream Apply topically 2 times daily. 45 g 1    diltiazem (CARDIZEM CD) 180 MG extended release capsule Take 1 capsule by mouth daily 30 capsule 3    apixaban (ELIQUIS) 5 MG TABS tablet Take 1 tablet by mouth 2 times daily 60 tablet 3     No current facility-administered medications for this visit. Adhesive tape and Influenza vaccines    Review of Systems:  General ROS: positive for  - fatigue  Psychological ROS: negative  Hematological and Lymphatic ROS: No history of blood clots or bleeding disorder. Respiratory ROS: positive for - cough and shortness of breath  Cardiovascular ROS: positive for - dyspnea on exertion, orthopnea and shortness of breath  Gastrointestinal ROS: no abdominal pain, change in bowel habits, or black or bloody stools  Genito-Urinary ROS: no dysuria, trouble voiding, or hematuria  Musculoskeletal ROS: negative  Neurological ROS: no TIA or stroke symptoms  Dermatological ROS: negative    VITALS:  Blood pressure (!) 140/86, pulse 90, height 5' 8\" (1.727 m). There is no height or weight on file to calculate BMI. Physical Examination:  General appearance - alert, well appearing, and in no distress and overweight  Mental status - alert, oriented to person, place, and time  Neck - Neck is supple, no JVD or carotid bruits. No thyromegaly or adenopathy. Chest - clear to auscultation, no wheezes, rales or rhonchi, symmetric air entry  Heart - irregularly irregular rhythm with rate 100. + murmur, 3/6 systolic right sb. Abdomen - soft, nontender, nondistended, no masses or organomegaly  Neurological - alert, oriented, normal speech, no focal findings or movement disorder noted  Extremities - peripheral pulses normal, no pedal edema, no clubbing or cyanosis  Skin - normal coloration and turgor, no rashes, no suspicious skin lesions noted      EKG: atrial fibrillation    Orders Placed This Encounter   Procedures    EKG 12 Lead       ASSESSMENT:     Diagnosis Orders   1. Persistent atrial fibrillation (HCC)  EKG 12 Lead   2. Mixed hyperlipidemia     3. Essential hypertension     4. S/P TAVR (transcatheter aortic valve replacement)     5. History of aortic stenosis     6. Morbid obesity due to excess calories [facility])           PLAN:     Patient will need to continue to follow up with you for their general medical care     As always, aggressive risk factor modification is strongly recommended. We should adhere to the JNC VIII guidelines for HTN management and the NCEP ATP III guidelines for LDL-C management. Cardiac diet is always recommended with low fat, cholesterol, calories and sodium. Continue medications at current doses. WILL CONSIDER DRUG LOAD WITH TIKOSYN WHEN HE IS BACK ON ELIQUIS FOR AT LEAST 4 WEEKS. FOLLOW UP WITH DR. MEI. Multiple signs and symptoms suggestive of sleep apnea, will consider sleep study after cardiac evaluation is completed    Patient was advised and encouraged to check blood pressure at home or at a pharmacy, maintain a logbook, and also call us back if blood pressure are above the target ranges or if it is low. Patient clearly understands and agrees to the instructions. We will need to continue to monitor muscle and liver enzymes, BUN, CR, and electrolytes. Details of medical condition explained and patient was warned about adverse consequences of uncontrolled medical conditions and possible side effects of prescribed medications.

## 2018-07-13 ENCOUNTER — TELEPHONE (OUTPATIENT)
Dept: FAMILY MEDICINE CLINIC | Age: 68
End: 2018-07-13

## 2018-07-13 DIAGNOSIS — N30.90 CYSTITIS: Primary | ICD-10-CM

## 2018-07-13 LAB
ORGANISM: ABNORMAL
URINE CULTURE, ROUTINE: ABNORMAL
URINE CULTURE, ROUTINE: ABNORMAL

## 2018-07-13 RX ORDER — CEFDINIR 300 MG/1
300 CAPSULE ORAL 2 TIMES DAILY
Qty: 20 CAPSULE | Refills: 0 | Status: SHIPPED | OUTPATIENT
Start: 2018-07-13 | End: 2018-07-23

## 2018-07-13 NOTE — TELEPHONE ENCOUNTER
Patient would like results of UA. NK out of office. Please advise here or in result note. Thank you.

## 2018-07-16 RX ORDER — CIPROFLOXACIN 500 MG/1
500 TABLET, FILM COATED ORAL 2 TIMES DAILY
Qty: 20 TABLET | Refills: 0 | Status: SHIPPED | OUTPATIENT
Start: 2018-07-16 | End: 2018-07-26

## 2018-07-23 ENCOUNTER — TELEPHONE (OUTPATIENT)
Dept: FAMILY MEDICINE CLINIC | Age: 68
End: 2018-07-23

## 2018-07-25 ENCOUNTER — NURSE ONLY (OUTPATIENT)
Dept: FAMILY MEDICINE CLINIC | Age: 68
End: 2018-07-25
Payer: MEDICARE

## 2018-07-25 DIAGNOSIS — R31.9 URINARY TRACT INFECTION WITH HEMATURIA, SITE UNSPECIFIED: Primary | ICD-10-CM

## 2018-07-25 DIAGNOSIS — N39.0 URINARY TRACT INFECTION WITH HEMATURIA, SITE UNSPECIFIED: ICD-10-CM

## 2018-07-25 DIAGNOSIS — N39.0 URINARY TRACT INFECTION WITH HEMATURIA, SITE UNSPECIFIED: Primary | ICD-10-CM

## 2018-07-25 DIAGNOSIS — R31.9 URINARY TRACT INFECTION WITH HEMATURIA, SITE UNSPECIFIED: ICD-10-CM

## 2018-07-25 LAB
BILIRUBIN, POC: NORMAL
BLOOD URINE, POC: NORMAL
CLARITY, POC: NORMAL
COLOR, POC: NORMAL
GLUCOSE URINE, POC: NORMAL
KETONES, POC: NORMAL
LEUKOCYTE EST, POC: NORMAL
NITRITE, POC: NORMAL
PH, POC: 6
PROTEIN, POC: NORMAL
SPECIFIC GRAVITY, POC: 1.02
UROBILINOGEN, POC: NORMAL

## 2018-07-25 PROCEDURE — 81003 URINALYSIS AUTO W/O SCOPE: CPT | Performed by: FAMILY MEDICINE

## 2018-07-25 RX ORDER — CYANOCOBALAMIN 1000 UG/ML
1000 INJECTION INTRAMUSCULAR; SUBCUTANEOUS ONCE
Status: SHIPPED | OUTPATIENT
Start: 2018-07-25 | End: 2038-07-26

## 2018-07-27 LAB — URINE CULTURE, ROUTINE: NORMAL

## 2018-08-08 DIAGNOSIS — M15.9 PRIMARY OSTEOARTHRITIS INVOLVING MULTIPLE JOINTS: ICD-10-CM

## 2018-08-09 RX ORDER — TRAMADOL HYDROCHLORIDE 50 MG/1
50 TABLET ORAL EVERY 6 HOURS PRN
Qty: 120 TABLET | Refills: 0 | Status: SHIPPED | OUTPATIENT
Start: 2018-08-09 | End: 2018-10-18 | Stop reason: SDUPTHER

## 2018-08-16 PROBLEM — I48.91 ATRIAL FIBRILLATION AND FLUTTER (HCC): Status: ACTIVE | Noted: 2018-08-16

## 2018-08-16 PROBLEM — I48.92 ATRIAL FIBRILLATION AND FLUTTER (HCC): Status: ACTIVE | Noted: 2018-08-16

## 2018-08-16 PROBLEM — I35.0 NONRHEUMATIC AORTIC (VALVE) STENOSIS: Status: ACTIVE | Noted: 2018-08-16

## 2018-08-16 RX ORDER — METRONIDAZOLE 7.5 MG/G
GEL TOPICAL
COMMUNITY
Start: 2018-07-18

## 2018-08-16 RX ORDER — CARVEDILOL 6.25 MG/1
6.25 TABLET ORAL 2 TIMES DAILY
COMMUNITY
Start: 2018-07-23 | End: 2018-08-23 | Stop reason: SDUPTHER

## 2018-08-23 ENCOUNTER — OFFICE VISIT (OUTPATIENT)
Dept: CARDIOLOGY CLINIC | Age: 68
End: 2018-08-23
Payer: MEDICARE

## 2018-08-23 VITALS
HEART RATE: 74 BPM | SYSTOLIC BLOOD PRESSURE: 138 MMHG | OXYGEN SATURATION: 98 % | RESPIRATION RATE: 16 BRPM | DIASTOLIC BLOOD PRESSURE: 84 MMHG

## 2018-08-23 DIAGNOSIS — I48.92 ATRIAL FIBRILLATION AND FLUTTER (HCC): ICD-10-CM

## 2018-08-23 DIAGNOSIS — E66.01 MORBID OBESITY DUE TO EXCESS CALORIES (HCC): ICD-10-CM

## 2018-08-23 DIAGNOSIS — I10 ESSENTIAL HYPERTENSION: ICD-10-CM

## 2018-08-23 DIAGNOSIS — R01.1 CARDIAC MURMUR: ICD-10-CM

## 2018-08-23 DIAGNOSIS — E78.2 MIXED HYPERLIPIDEMIA: ICD-10-CM

## 2018-08-23 DIAGNOSIS — Z95.2 S/P TAVR (TRANSCATHETER AORTIC VALVE REPLACEMENT): ICD-10-CM

## 2018-08-23 DIAGNOSIS — I48.19 PERSISTENT ATRIAL FIBRILLATION (HCC): Primary | ICD-10-CM

## 2018-08-23 DIAGNOSIS — Z86.79 HISTORY OF AORTIC STENOSIS: ICD-10-CM

## 2018-08-23 DIAGNOSIS — I48.91 ATRIAL FIBRILLATION AND FLUTTER (HCC): ICD-10-CM

## 2018-08-23 PROCEDURE — 4040F PNEUMOC VAC/ADMIN/RCVD: CPT | Performed by: INTERNAL MEDICINE

## 2018-08-23 PROCEDURE — 99214 OFFICE O/P EST MOD 30 MIN: CPT | Performed by: INTERNAL MEDICINE

## 2018-08-23 PROCEDURE — 93000 ELECTROCARDIOGRAM COMPLETE: CPT | Performed by: INTERNAL MEDICINE

## 2018-08-23 PROCEDURE — 1036F TOBACCO NON-USER: CPT | Performed by: INTERNAL MEDICINE

## 2018-08-23 PROCEDURE — G8417 CALC BMI ABV UP PARAM F/U: HCPCS | Performed by: INTERNAL MEDICINE

## 2018-08-23 PROCEDURE — 1101F PT FALLS ASSESS-DOCD LE1/YR: CPT | Performed by: INTERNAL MEDICINE

## 2018-08-23 PROCEDURE — 1123F ACP DISCUSS/DSCN MKR DOCD: CPT | Performed by: INTERNAL MEDICINE

## 2018-08-23 PROCEDURE — G8427 DOCREV CUR MEDS BY ELIG CLIN: HCPCS | Performed by: INTERNAL MEDICINE

## 2018-08-23 PROCEDURE — 3017F COLORECTAL CA SCREEN DOC REV: CPT | Performed by: INTERNAL MEDICINE

## 2018-08-23 RX ORDER — BENZOCAINE/MENTHOL 6 MG-10 MG
1 LOZENGE MUCOUS MEMBRANE 2 TIMES DAILY
Qty: 1 EACH | Refills: 0 | Status: SHIPPED | OUTPATIENT
Start: 2018-08-23

## 2018-08-23 RX ORDER — CARVEDILOL 6.25 MG/1
6.25 TABLET ORAL 2 TIMES DAILY
Qty: 180 TABLET | Refills: 3 | Status: SHIPPED | OUTPATIENT
Start: 2018-08-23 | End: 2018-09-04 | Stop reason: ALTCHOICE

## 2018-08-23 NOTE — PROGRESS NOTES
disorder. Respiratory ROS: positive for - cough and shortness of breath  Cardiovascular ROS: positive for - dyspnea on exertion, orthopnea and shortness of breath  Gastrointestinal ROS: no abdominal pain, change in bowel habits, or black or bloody stools  Genito-Urinary ROS: no dysuria, trouble voiding, or hematuria  Musculoskeletal ROS: negative  Neurological ROS: no TIA or stroke symptoms  Dermatological ROS: negative    VITALS:  Blood pressure 138/84, pulse 74, resp. rate 16, SpO2 98 %. There is no height or weight on file to calculate BMI. Physical Examination:  General appearance - alert, well appearing, and in no distress and overweight  Mental status - alert, oriented to person, place, and time  Neck - Neck is supple, no JVD or carotid bruits. No thyromegaly or adenopathy. Chest - clear to auscultation, no wheezes, rales or rhonchi, symmetric air entry  Heart - irregularly irregular rhythm with rate 100. + murmur, 3/6 systolic right sb. Abdomen - soft, nontender, nondistended, no masses or organomegaly  Neurological - alert, oriented, normal speech, no focal findings or movement disorder noted  Extremities - peripheral pulses normal, no pedal edema, no clubbing or cyanosis  Skin - normal coloration and turgor, no rashes, no suspicious skin lesions noted      EKG: atrial fibrillation    Orders Placed This Encounter   Procedures    EKG 12 Lead       ASSESSMENT:     Diagnosis Orders   1. Persistent atrial fibrillation (HCC)  EKG 12 Lead    carvedilol (COREG) 6.25 MG tablet   2. Essential hypertension  Blood Pressure Monitor MISC   3. Morbid obesity due to excess calories ([address]     4. Mixed hyperlipidemia     5. S/P TAVR (transcatheter aortic valve replacement)     6. Cardiac murmur     7. Atrial fibrillation and flutter ([address]     8.  History of aortic stenosis           PLAN:     Patient will need to continue to follow up with you for their general medical care     As always, aggressive risk factor

## 2018-08-27 LAB
A/G RATIO: 1.1 (ref 0.9–2.4)
ALBUMIN: 4.2 G/DL (ref 3.4–5)
ALP BLD-CCNC: 84 U/L (ref 45–117)
ALT SERPL-CCNC: 12 U/L (ref 10–52)
ANION GAP SERPL CALCULATED.3IONS-SCNC: 12 MMOL/L (ref 10–20)
AST SERPL-CCNC: 19 U/L (ref 13–39)
BASOPHILS # BLD: 1.1 % (ref 0–1.3)
BASOPHILS ABSOLUTE: 0.09 10*3/UL (ref 0.01–0.09)
BICARBONATE: 31 MMOL/L (ref 21–32)
BILIRUB SERPL-MCNC: 0.6 MG/DL (ref 0–1.2)
BUN / CREAT RATIO: 21 (ref 5–25)
CALCIUM SERPL-MCNC: 9.3 MG/DL (ref 8.6–10.3)
CHLORIDE BLD-SCNC: 100 MMOL/L (ref 98–107)
CREAT SERPL-MCNC: 0.99 MG/DL (ref 0.5–1.3)
EOSINOPHIL # BLD: 6.8 % (ref 0–6.7)
EOSINOPHILS ABSOLUTE: 0.58 10*3/UL (ref 0.01–0.46)
ERYTHROCYTE [DISTWIDTH] IN BLOOD BY AUTOMATED COUNT: 14.3 % (ref 12–15.4)
ERYTHROCYTE [DISTWIDTH] IN BLOOD BY AUTOMATED COUNT: 54.6 FL (ref 39.3–48.6)
GFR CALCULATED: >60
GLUCOSE: 101 MG/DL (ref 70–100)
HCT VFR BLD CALC: 42.3 % (ref 38.4–54.9)
HEMOGLOBIN: 13.7 G/DL (ref 12.8–17.7)
IMMATURE GRANS (ABS): 0.02 10*3/UL (ref 0–0.21)
IMMATURE GRANULOCYTES: 0.2 %
INR BLD: 1.36 (ref 0.9–1.1)
LYMPHOCYTES # BLD: 15.7 % (ref 9.4–41.1)
LYMPHOCYTES ABSOLUTE: 1.34 10*3/UL (ref 0.4–2.84)
MCH RBC QN AUTO: 33.5 PG (ref 27.5–32.9)
MCHC RBC AUTO-ENTMCNC: 32.4 G/DL (ref 30.5–35.4)
MCV RBC AUTO: 103.4 FL (ref 83.3–98.2)
MONOCYTES # BLD: 8.7 % (ref 3–16.2)
MONOCYTES ABSOLUTE: 0.74 10*3/UL (ref 0.25–1.33)
NEUTROPHILS ABSOLUTE: 5.77 10*3/UL (ref 2.22–7.53)
NEUTROPHILS: 67.5 % (ref 46.2–79.1)
NUCLEATED RBCS: 0 /100{WBCS}
PARTIAL THROMBOPLASTIN TIME: 28.9 SEC (ref 25–36)
PLATELET # BLD: 196 10*3/UL (ref 155–404)
PMV BLD AUTO: 10.4 FL (ref 9.9–12.1)
POTASSIUM SERPL-SCNC: 3.8 MMOL/L (ref 3.5–5.1)
PROTHROMBIN TIME: 15.1 SEC (ref 9.8–12.7)
RBC: 4.09 10*6/UL (ref 4.08–6.37)
RBCS COUNTED: 0 10*3/UL
SODIUM BLD-SCNC: 139 MMOL/L (ref 136–145)
THYROXINE (T4): 7.8 UG/DL (ref 7.1–13.1)
THYROXINE, FREE: 0.9 NG/DL (ref 0.61–1.12)
TOTAL PROTEIN: 8 G/DL (ref 6.4–8.2)
TSH SERPL DL<=0.05 MIU/L-ACNC: 6.45 MU/L (ref 0.44–3.98)
UREA NITROGEN: 21 MG/DL (ref 6–23)
WBC: 8.5 10*3/UL (ref 4.2–11)

## 2018-08-30 LAB
ANION GAP SERPL CALCULATED.3IONS-SCNC: 13 MMOL/L (ref 10–20)
BICARBONATE: 27 MMOL/L (ref 21–32)
BUN / CREAT RATIO: 19 (ref 5–25)
CALCIUM SERPL-MCNC: 9.4 MG/DL (ref 8.6–10.3)
CHLORIDE BLD-SCNC: 103 MMOL/L (ref 98–107)
CREAT SERPL-MCNC: 0.88 MG/DL (ref 0.5–1.3)
GFR CALCULATED: >60
GLUCOSE: 98 MG/DL (ref 70–100)
MAGNESIUM: 2.1 MG/DL (ref 1.6–2.4)
POTASSIUM SERPL-SCNC: 3.7 MMOL/L (ref 3.5–5.1)
SODIUM BLD-SCNC: 139 MMOL/L (ref 136–145)
UREA NITROGEN: 17 MG/DL (ref 6–23)

## 2018-09-04 ENCOUNTER — TELEPHONE (OUTPATIENT)
Dept: FAMILY MEDICINE CLINIC | Age: 68
End: 2018-09-04

## 2018-09-04 RX ORDER — METOPROLOL TARTRATE 50 MG/1
50 TABLET, FILM COATED ORAL 2 TIMES DAILY
COMMUNITY
Start: 2018-08-30 | End: 2018-09-28 | Stop reason: SDUPTHER

## 2018-09-05 DIAGNOSIS — Z12.5 PROSTATE CANCER SCREENING: Primary | ICD-10-CM

## 2018-09-05 DIAGNOSIS — Z12.5 PROSTATE CANCER SCREENING: ICD-10-CM

## 2018-09-05 LAB
ANION GAP SERPL CALCULATED.3IONS-SCNC: 15 MEQ/L (ref 7–13)
BUN BLDV-MCNC: 25 MG/DL (ref 8–23)
CALCIUM SERPL-MCNC: 9.4 MG/DL (ref 8.6–10.2)
CHLORIDE BLD-SCNC: 103 MEQ/L (ref 98–107)
CO2: 23 MEQ/L (ref 22–29)
CREAT SERPL-MCNC: 0.84 MG/DL (ref 0.7–1.2)
GFR AFRICAN AMERICAN: >60
GFR NON-AFRICAN AMERICAN: >60
GLUCOSE BLD-MCNC: 93 MG/DL (ref 74–109)
POTASSIUM SERPL-SCNC: 4.5 MEQ/L (ref 3.5–5.1)
SODIUM BLD-SCNC: 141 MEQ/L (ref 132–144)

## 2018-09-13 ENCOUNTER — OFFICE VISIT (OUTPATIENT)
Dept: CARDIOLOGY CLINIC | Age: 68
End: 2018-09-13
Payer: MEDICARE

## 2018-09-13 VITALS
OXYGEN SATURATION: 96 % | BODY MASS INDEX: 47.74 KG/M2 | WEIGHT: 315 LBS | RESPIRATION RATE: 20 BRPM | HEIGHT: 68 IN | SYSTOLIC BLOOD PRESSURE: 126 MMHG | TEMPERATURE: 97.1 F | DIASTOLIC BLOOD PRESSURE: 82 MMHG | HEART RATE: 67 BPM

## 2018-09-13 DIAGNOSIS — I48.92 ATRIAL FIBRILLATION AND FLUTTER (HCC): Primary | ICD-10-CM

## 2018-09-13 DIAGNOSIS — I10 ESSENTIAL HYPERTENSION: ICD-10-CM

## 2018-09-13 DIAGNOSIS — E78.2 MIXED HYPERLIPIDEMIA: ICD-10-CM

## 2018-09-13 DIAGNOSIS — E66.01 MORBID OBESITY DUE TO EXCESS CALORIES (HCC): ICD-10-CM

## 2018-09-13 DIAGNOSIS — I48.91 ATRIAL FIBRILLATION AND FLUTTER (HCC): Primary | ICD-10-CM

## 2018-09-13 DIAGNOSIS — Z95.2 S/P TAVR (TRANSCATHETER AORTIC VALVE REPLACEMENT): ICD-10-CM

## 2018-09-13 DIAGNOSIS — I48.19 PERSISTENT ATRIAL FIBRILLATION (HCC): ICD-10-CM

## 2018-09-13 PROCEDURE — 1101F PT FALLS ASSESS-DOCD LE1/YR: CPT | Performed by: INTERNAL MEDICINE

## 2018-09-13 PROCEDURE — 1123F ACP DISCUSS/DSCN MKR DOCD: CPT | Performed by: INTERNAL MEDICINE

## 2018-09-13 PROCEDURE — G8417 CALC BMI ABV UP PARAM F/U: HCPCS | Performed by: INTERNAL MEDICINE

## 2018-09-13 PROCEDURE — 3017F COLORECTAL CA SCREEN DOC REV: CPT | Performed by: INTERNAL MEDICINE

## 2018-09-13 PROCEDURE — G8427 DOCREV CUR MEDS BY ELIG CLIN: HCPCS | Performed by: INTERNAL MEDICINE

## 2018-09-13 PROCEDURE — 4040F PNEUMOC VAC/ADMIN/RCVD: CPT | Performed by: INTERNAL MEDICINE

## 2018-09-13 PROCEDURE — 99214 OFFICE O/P EST MOD 30 MIN: CPT | Performed by: INTERNAL MEDICINE

## 2018-09-13 PROCEDURE — 93000 ELECTROCARDIOGRAM COMPLETE: CPT | Performed by: INTERNAL MEDICINE

## 2018-09-13 PROCEDURE — 1036F TOBACCO NON-USER: CPT | Performed by: INTERNAL MEDICINE

## 2018-09-13 NOTE — PROGRESS NOTES
Chief Complaint   Patient presents with    Follow Up After Procedure     Cardioversion at [facility] Atrial Fibrillation       4-26-18: Patient presents for initial medical evaluation. Patient is followed on a regular basis by Dr. Joesph Zavala MD. Has been dealing with URI/bronchitis for the past week or so. Feeling tired, SOB. EKG today shows afibb at 111bpm. Does have orthopnea as well. No previous hx of afibb. No hx of MI, CHF or arrhythmias. No hx of LHC. Last stress test was 10 yrs ago. Pt denies chest pain,   nausea, vomiting, diarrhea, constipation, motor weakness, insomnia, weight loss, syncope, dizziness, lightheadedness, palpitations, PND, orthopnea, or claudication. + cough and nasal congestion. 5-3-18: feeling better overall, still SOB with mild exertion but much improved from last office visit. . s/p CXR with cardiomegaly and right pleural effusion. s/p ECHO with EF of 45%, mod LAE, Severe AS with Vmax of 4.3m/s, Peak of 74mmHg, mean of 38mmHg, AMARILYS 0.84cm2, RVSP of 35mmHg. BNP was 7000, lipid profile is ok, trop was negative. Renal function is normal. Diuresed well with lasix. HR and BP are controlled now. EKG with Afibb at 85bpm today. Pt denies chest pain,   nausea, vomiting, diarrhea, constipation, motor weakness, insomnia, weight loss, syncope, dizziness, lightheadedness, palpitations, PND, orthopnea. No LE discoloration or ulcers. + b/l  LE edema. Lipid profile is normal. No recent hospitalization. 5-15-18: s/p LHC with mild CAD, valve not able to be crossed but did not try hard to cross. s/p EMMANUEL with severe AS, EF of 35-40%, mild MR, mild AI, mild PHTN. s/p unsuccessful CV . EKG today with afibb at 84bpm. Pt denies chest pain, dyspnea, dyspnea on exertion, change in exercise capacity,  nausea, vomiting, diarrhea, constipation, motor weakness, insomnia, weight loss, syncope, dizziness, lightheadedness, palpitations, PND, orthopnea, or claudication.  He feels very tired and sleeps all the time. he has an appointment with [facility] for AV TAVR or surgery. On Eliquis and no bleeding issues. On amiodarone as well.     7-10-18: S/P TAVR AT  WITH KIRAN. WAS PLACED ON ASA/PLAVIX FOR ONE MONTH AND HE IS TO RESUME ELIQUIS AFTER. COREG WAS STOPPED AND PLACED ON CARDIZEM. HE IS NOT ON AMIODARONE. Pt denies chest pain, dyspnea, dyspnea on exertion, change in exercise capacity, fatigue,  nausea, vomiting, diarrhea, constipation, motor weakness, insomnia, weight loss, syncope, dizziness, lightheadedness, palpitations, PND, orthopnea, or claudication. NO ISSUES WITH THE PROCEDURE OTHER THAN UTI. No nitro use. BP and hr are good. CAD is stable. No LE discoloration or ulcers. No LE edema. No CHF type symptoms. Lipid profile is normal. ekg TODAY WITH AFIBB AT 90BPM.       8-23-18: HAS BEEN ON NOAC FOR AT LEAST 4 WEEKS. EKG today shows afibb with CVR. Pt denies chest pain, dyspnea, dyspnea on exertion, change in exercise capacity, fatigue,  nausea, vomiting, diarrhea, constipation, motor weakness, insomnia, weight loss, syncope, dizziness, lightheadedness, palpitations, PND, orthopnea, or claudication. No nitro use. BP and hr are good. CAD is stable. No LE discoloration or ulcers. No LE edema. No CHF type symptoms. Lipid profile is normal. No recent hospitalization. No change in meds. Uses a walker. Not able to lose weight. Does not want to have [facility] testing. 9-13-18: s/p hosp for Tikosin loading but did not tolerate, QTC was too long and was having 3-4 sec pauses. Placed on CCB and BBLocker. On NOAC, no bleeding issues. S/p CV into NSR. Was on amiodarone previously. EKG with NSR, LBBB. He does not feel really when he's in afibb. Feeling ok now. Pt denies chest pain, dyspnea, dyspnea on exertion, change in exercise capacity, fatigue,  nausea, vomiting, diarrhea, constipation, motor weakness, insomnia, weight loss, syncope, dizziness, lightheadedness, palpitations, PND, orthopnea, or claudication.       Patient Active Problem List   Diagnosis    Colon polyps    Seasonal allergies    Essential hypertension    Morbid obesity due to excess calories (HCC)    Irritable bowel syndrome without diarrhea    Anemia    Mixed hyperlipidemia    Persistent atrial fibrillation (HCC)    Cardiac murmur    Primary osteoarthritis involving multiple joints    S/P TAVR (transcatheter aortic valve replacement)    History of aortic stenosis    Atrial fibrillation and flutter (HCC)    Nonrheumatic aortic (valve) stenosis       Past Surgical History:   Procedure Laterality Date    AORTIC VALVE SURGERY  [id number]     [address]  03/2010    COLONOSCOPY  2009    KNEE SURGERY  1992       Social History     Social History    Marital status:      Spouse name: N/A    Number of children: N/A    Years of education: N/A     Social History Main Topics    Smoking status: Never Smoker    Smokeless tobacco: Never Used    Alcohol use No    Drug use: No    Sexual activity: Not Asked     Other Topics Concern    None     Social History Narrative    None       Family History   Problem Relation Age of Onset    Other Mother         BRAIN TUMOR       Current Outpatient Prescriptions   Medication Sig Dispense Refill    metoprolol tartrate (LOPRESSOR) 50 MG tablet 50 mg 2 times daily      Blood Pressure Monitor MISC 1 kit by Does not apply route 2 times daily 1 each 0    metroNIDAZOLE (METROGEL) 0.75 % gel       aspirin 81 MG tablet Take 81 mg by mouth daily      furosemide (LASIX) 40 MG tablet take 1 tablet by mouth once daily 30 tablet 3    terazosin (HYTRIN) 10 MG capsule Take 1 capsule by mouth daily 90 capsule 3    cyclobenzaprine (FLEXERIL) 10 MG tablet TAKE 1 TABLET EVERY 8 HOURS AS NEEDED FOR MUSCLE SPASMS 180 tablet 1    triamcinolone (KENALOG) 0.1 % cream Apply topically 2 times daily.  45 g 1    diltiazem (CARDIZEM CD) 180 MG extended release capsule Take 1 capsule by mouth daily 30 capsule 3    apixaban (ELIQUIS) 5 MG TABS tablet Take 1 tablet by mouth 2 times daily 60 tablet 3     Current Facility-Administered Medications   Medication Dose Route Frequency Provider Last Rate Last Dose    cyanocobalamin injection 1,000 mcg  1,000 mcg Intramuscular Once Mis Kay MD           Adhesive tape    Review of Systems:  General ROS: positive for  - fatigue  Psychological ROS: negative  Hematological and Lymphatic ROS: No history of blood clots or bleeding disorder. Respiratory ROS: positive for - cough and shortness of breath  Cardiovascular ROS: positive for - dyspnea on exertion, orthopnea and shortness of breath  Gastrointestinal ROS: no abdominal pain, change in bowel habits, or black or bloody stools  Genito-Urinary ROS: no dysuria, trouble voiding, or hematuria  Musculoskeletal ROS: negative  Neurological ROS: no TIA or stroke symptoms  Dermatological ROS: negative    VITALS:  Blood pressure 126/82, pulse 67, temperature 97.1 °F (36.2 °C), temperature source Temporal, resp. rate 20, height 5' 8\" (1.727 m), weight (!) 399 lb (181 kg), SpO2 96 %. Body mass index is 60.67 kg/m². Physical Examination:  General appearance - alert, well appearing, and in no distress and overweight  Mental status - alert, oriented to person, place, and time  Neck - Neck is supple, no JVD or carotid bruits. No thyromegaly or adenopathy. Chest - clear to auscultation, no wheezes, rales or rhonchi, symmetric air entry  Heart - irregularly irregular rhythm with rate 100. + murmur, 3/6 systolic right sb.    Abdomen - soft, nontender, nondistended, no masses or organomegaly  Neurological - alert, oriented, normal speech, no focal findings or movement disorder noted  Extremities - peripheral pulses normal, no pedal edema, no clubbing or cyanosis  Skin - normal coloration and turgor, no rashes, no suspicious skin lesions noted      EKG: atrial fibrillation    Orders Placed This Encounter   Procedures    EKG 12 Lead

## 2018-09-27 LAB
A/G RATIO: 1.1 (ref 0.9–2.4)
ALBUMIN: 3.9 G/DL (ref 3.4–5)
ALP BLD-CCNC: 80 U/L (ref 45–117)
ALT SERPL-CCNC: 15 U/L (ref 10–52)
ANION GAP SERPL CALCULATED.3IONS-SCNC: 12 MMOL/L (ref 10–20)
AST SERPL-CCNC: 21 U/L (ref 13–39)
BICARBONATE: 29 MMOL/L (ref 21–32)
BILIRUB SERPL-MCNC: 0.5 MG/DL (ref 0–1.2)
BUN / CREAT RATIO: 19 (ref 5–25)
CALCIUM SERPL-MCNC: 9.2 MG/DL (ref 8.6–10.3)
CHLORIDE BLD-SCNC: 101 MMOL/L (ref 98–107)
CREAT SERPL-MCNC: 0.83 MG/DL (ref 0.5–1.3)
ERYTHROCYTE [DISTWIDTH] IN BLOOD BY AUTOMATED COUNT: 13.1 % (ref 12–15.4)
ERYTHROCYTE [DISTWIDTH] IN BLOOD BY AUTOMATED COUNT: 48.3 FL (ref 39.3–48.6)
GFR CALCULATED: >60
GLUCOSE: 96 MG/DL (ref 70–100)
HCT VFR BLD CALC: 39 % (ref 38.4–54.9)
HEMOGLOBIN: 13.3 G/DL (ref 12.8–17.7)
MCH RBC QN AUTO: 34.5 PG (ref 27.5–32.9)
MCHC RBC AUTO-ENTMCNC: 34.1 G/DL (ref 30.5–35.4)
MCV RBC AUTO: 101 FL (ref 83.3–98.2)
NUCLEATED RBCS: 0 /100{WBCS}
PARTIAL THROMBOPLASTIN TIME: 31.8 SEC (ref 25–36)
PLATELET # BLD: 179 10*3/UL (ref 155–404)
PMV BLD AUTO: 9.7 FL (ref 9.9–12.1)
POTASSIUM SERPL-SCNC: 3.9 MMOL/L (ref 3.5–5.1)
RBC: 3.86 10*6/UL (ref 4.08–6.37)
RBCS COUNTED: 0 10*3/UL
SODIUM BLD-SCNC: 138 MMOL/L (ref 136–145)
TOTAL PROTEIN: 7.5 G/DL (ref 6.4–8.2)
UREA NITROGEN: 16 MG/DL (ref 6–23)
WBC: 7.2 10*3/UL (ref 4.2–11)

## 2018-09-28 RX ORDER — METOPROLOL TARTRATE 50 MG/1
50 TABLET, FILM COATED ORAL 2 TIMES DAILY
Qty: 180 TABLET | Refills: 3 | Status: SHIPPED | OUTPATIENT
Start: 2018-09-28 | End: 2019-05-30 | Stop reason: SDUPTHER

## 2018-09-28 RX ORDER — DILTIAZEM HYDROCHLORIDE 180 MG/1
180 CAPSULE, COATED, EXTENDED RELEASE ORAL DAILY
Qty: 90 CAPSULE | Refills: 3 | Status: SHIPPED | OUTPATIENT
Start: 2018-09-28 | End: 2019-09-06 | Stop reason: SDUPTHER

## 2018-09-28 NOTE — TELEPHONE ENCOUNTER
PT REQUESTING NEW RX'S TO [facility] SO HE CAN GET MAIL ORDER. PT LAST SEEN 9/13/18 WITH DR. HOLIDAY.

## 2018-10-10 ENCOUNTER — NURSE ONLY (OUTPATIENT)
Dept: FAMILY MEDICINE CLINIC | Age: 68
End: 2018-10-10

## 2018-10-10 ENCOUNTER — TELEPHONE (OUTPATIENT)
Dept: FAMILY MEDICINE CLINIC | Age: 68
End: 2018-10-10

## 2018-10-10 DIAGNOSIS — R35.0 URINARY FREQUENCY: Primary | ICD-10-CM

## 2018-10-10 DIAGNOSIS — N30.00 ACUTE CYSTITIS WITHOUT HEMATURIA: Primary | ICD-10-CM

## 2018-10-10 DIAGNOSIS — R35.0 URINARY FREQUENCY: ICD-10-CM

## 2018-10-10 DIAGNOSIS — R30.0 DYSURIA: ICD-10-CM

## 2018-10-10 DIAGNOSIS — R30.0 DYSURIA: Primary | ICD-10-CM

## 2018-10-13 LAB
ORGANISM: ABNORMAL
URINE CULTURE, ROUTINE: ABNORMAL
URINE CULTURE, ROUTINE: ABNORMAL

## 2018-10-15 RX ORDER — CIPROFLOXACIN 250 MG/1
250 TABLET, FILM COATED ORAL 2 TIMES DAILY
Qty: 14 TABLET | Refills: 0 | Status: SHIPPED | OUTPATIENT
Start: 2018-10-15 | End: 2018-10-22

## 2018-10-18 ENCOUNTER — OFFICE VISIT (OUTPATIENT)
Dept: FAMILY MEDICINE CLINIC | Age: 68
End: 2018-10-18
Payer: MEDICARE

## 2018-10-18 VITALS
TEMPERATURE: 97.6 F | BODY MASS INDEX: 47.74 KG/M2 | HEIGHT: 68 IN | SYSTOLIC BLOOD PRESSURE: 132 MMHG | DIASTOLIC BLOOD PRESSURE: 76 MMHG | WEIGHT: 315 LBS | HEART RATE: 68 BPM | RESPIRATION RATE: 16 BRPM

## 2018-10-18 DIAGNOSIS — J30.2 SEASONAL ALLERGIES: ICD-10-CM

## 2018-10-18 DIAGNOSIS — I48.19 PERSISTENT ATRIAL FIBRILLATION (HCC): Primary | ICD-10-CM

## 2018-10-18 DIAGNOSIS — K58.9 IRRITABLE BOWEL SYNDROME WITHOUT DIARRHEA: ICD-10-CM

## 2018-10-18 DIAGNOSIS — M15.9 PRIMARY OSTEOARTHRITIS INVOLVING MULTIPLE JOINTS: ICD-10-CM

## 2018-10-18 DIAGNOSIS — E66.01 MORBID OBESITY DUE TO EXCESS CALORIES (HCC): ICD-10-CM

## 2018-10-18 DIAGNOSIS — I10 ESSENTIAL HYPERTENSION: ICD-10-CM

## 2018-10-18 DIAGNOSIS — E78.2 MIXED HYPERLIPIDEMIA: ICD-10-CM

## 2018-10-18 DIAGNOSIS — Z12.5 PROSTATE CANCER SCREENING: ICD-10-CM

## 2018-10-18 LAB — PROSTATE SPECIFIC ANTIGEN: 1.85 NG/ML (ref 0–5.4)

## 2018-10-18 PROCEDURE — 1036F TOBACCO NON-USER: CPT | Performed by: FAMILY MEDICINE

## 2018-10-18 PROCEDURE — 1101F PT FALLS ASSESS-DOCD LE1/YR: CPT | Performed by: FAMILY MEDICINE

## 2018-10-18 PROCEDURE — 3017F COLORECTAL CA SCREEN DOC REV: CPT | Performed by: FAMILY MEDICINE

## 2018-10-18 PROCEDURE — G0009 ADMIN PNEUMOCOCCAL VACCINE: HCPCS | Performed by: FAMILY MEDICINE

## 2018-10-18 PROCEDURE — G0008 ADMIN INFLUENZA VIRUS VAC: HCPCS | Performed by: FAMILY MEDICINE

## 2018-10-18 PROCEDURE — 1123F ACP DISCUSS/DSCN MKR DOCD: CPT | Performed by: FAMILY MEDICINE

## 2018-10-18 PROCEDURE — 90662 IIV NO PRSV INCREASED AG IM: CPT | Performed by: FAMILY MEDICINE

## 2018-10-18 PROCEDURE — 90732 PPSV23 VACC 2 YRS+ SUBQ/IM: CPT | Performed by: FAMILY MEDICINE

## 2018-10-18 PROCEDURE — 4040F PNEUMOC VAC/ADMIN/RCVD: CPT | Performed by: FAMILY MEDICINE

## 2018-10-18 PROCEDURE — G8417 CALC BMI ABV UP PARAM F/U: HCPCS | Performed by: FAMILY MEDICINE

## 2018-10-18 PROCEDURE — G8427 DOCREV CUR MEDS BY ELIG CLIN: HCPCS | Performed by: FAMILY MEDICINE

## 2018-10-18 PROCEDURE — G8482 FLU IMMUNIZE ORDER/ADMIN: HCPCS | Performed by: FAMILY MEDICINE

## 2018-10-18 PROCEDURE — 99214 OFFICE O/P EST MOD 30 MIN: CPT | Performed by: FAMILY MEDICINE

## 2018-10-18 RX ORDER — TRAMADOL HYDROCHLORIDE 50 MG/1
50 TABLET ORAL EVERY 6 HOURS PRN
Qty: 120 TABLET | Refills: 0 | Status: SHIPPED | OUTPATIENT
Start: 2018-10-18 | End: 2018-12-27 | Stop reason: SDUPTHER

## 2018-10-18 RX ORDER — IRBESARTAN 150 MG/1
TABLET ORAL
COMMUNITY
Start: 2018-09-13

## 2018-10-18 NOTE — PROGRESS NOTES
Chief Complaint   Patient presents with    Hypertension     6 mo f/up     Discuss Medications     discuss Lasix   bp good at home and here in the office  Tolerating medication well  No cp/sob  no other side effects noted    s/p aortic valve surgery  doing well    afib stable  doing better  sees cardio    edema stable  on lasix  sees cardio as well    rosacea somewhat better  meds help him    Obese condition still  rec weight loss  discuss nutrition    oa knees continue at this time  Improved with injection  Saw orthopedic     Hi chol continues  Watches diet  Discussed nutrition    Treatment Adherence:   Medication compliance:  compliant all of the time  Diet compliance:  compliant all of the time  Weight trend: decreasing  Current exercise: no regular exercise  Barriers: none    Hypertension:  Home blood pressure monitoring: No.  He is adherent to a low sodium diet. Patient denies chest pain, shortness of breath, headache, lightheadedness, blurred vision, peripheral edema, palpitations, dry cough and fatigue. Antihypertensive medication side effects: no medication side effects noted. Use of agents associated with hypertension: none. Sodium (mmol/L)   Date Value   09/27/2018 138    BUN (mg/dL)   Date Value   09/05/2018 25 (H)    Glucose (mg/dL)   Date Value   09/27/2018 96      Potassium (mmol/L)   Date Value   09/27/2018 3.9    CREATININE (mg/dL)   Date Value   09/27/2018 0.83         Hyperlipidemia:  No new myalgias or GI upset on none.      Lab Results   Component Value Date    CHOL 164 04/26/2018    TRIG 77 04/26/2018    HDL 39 (L) 04/26/2018    LDLCALC 110 04/26/2018     Lab Results   Component Value Date    ALT 15 09/27/2018    AST 21 09/27/2018            Patient Active Problem List   Diagnosis    Colon polyps    Seasonal allergies    Essential hypertension    Morbid obesity due to excess calories (HCC)    Irritable bowel syndrome without diarrhea    Anemia    treatments discussed., No signs of potential drug abuse or diversion identified. Thom Navarrete MD)     Return in about 3 months (around 1/18/2019). keep cardio eval  Patient education provided. They understand and agree with this course of treatment. They will return with new or worsening symptoms. Patient instructed to remain current with appropriate annual health maintenance.

## 2018-10-25 NOTE — TELEPHONE ENCOUNTER
From: Jada Cruz  Sent: 10/25/2018 1:54 PM EDT  Subject: Medication Renewal Request    Jada Leonard.  Anthony would like a refill of the following medications:     furosemide (LASIX) 40 MG tablet [Ned Richards, DO]   Patient Comment: need 90 days sent to [facility]    Preferred pharmacy: [address] , [address] [facility] [phone] - F [phone]

## 2018-10-26 RX ORDER — FUROSEMIDE 40 MG/1
TABLET ORAL
Qty: 30 TABLET | Refills: 3 | Status: SHIPPED | OUTPATIENT
Start: 2018-10-26 | End: 2018-12-20 | Stop reason: SDUPTHER

## 2018-11-20 ENCOUNTER — OFFICE VISIT (OUTPATIENT)
Dept: CARDIOLOGY CLINIC | Age: 68
End: 2018-11-20
Payer: MEDICARE

## 2018-11-20 VITALS
OXYGEN SATURATION: 98 % | DIASTOLIC BLOOD PRESSURE: 82 MMHG | HEART RATE: 85 BPM | RESPIRATION RATE: 16 BRPM | SYSTOLIC BLOOD PRESSURE: 144 MMHG

## 2018-11-20 DIAGNOSIS — I10 ESSENTIAL HYPERTENSION: ICD-10-CM

## 2018-11-20 DIAGNOSIS — E78.2 MIXED HYPERLIPIDEMIA: ICD-10-CM

## 2018-11-20 DIAGNOSIS — I48.92 ATRIAL FIBRILLATION AND FLUTTER (HCC): ICD-10-CM

## 2018-11-20 DIAGNOSIS — Z95.2 S/P TAVR (TRANSCATHETER AORTIC VALVE REPLACEMENT): Primary | ICD-10-CM

## 2018-11-20 DIAGNOSIS — E66.01 MORBID OBESITY DUE TO EXCESS CALORIES (HCC): ICD-10-CM

## 2018-11-20 DIAGNOSIS — I48.91 ATRIAL FIBRILLATION AND FLUTTER (HCC): ICD-10-CM

## 2018-11-20 DIAGNOSIS — Z86.79 HISTORY OF AORTIC STENOSIS: ICD-10-CM

## 2018-11-20 DIAGNOSIS — I48.19 PERSISTENT ATRIAL FIBRILLATION (HCC): ICD-10-CM

## 2018-11-20 PROCEDURE — 3017F COLORECTAL CA SCREEN DOC REV: CPT | Performed by: INTERNAL MEDICINE

## 2018-11-20 PROCEDURE — G8427 DOCREV CUR MEDS BY ELIG CLIN: HCPCS | Performed by: INTERNAL MEDICINE

## 2018-11-20 PROCEDURE — 1123F ACP DISCUSS/DSCN MKR DOCD: CPT | Performed by: INTERNAL MEDICINE

## 2018-11-20 PROCEDURE — 99213 OFFICE O/P EST LOW 20 MIN: CPT | Performed by: INTERNAL MEDICINE

## 2018-11-20 PROCEDURE — G8482 FLU IMMUNIZE ORDER/ADMIN: HCPCS | Performed by: INTERNAL MEDICINE

## 2018-11-20 PROCEDURE — 4040F PNEUMOC VAC/ADMIN/RCVD: CPT | Performed by: INTERNAL MEDICINE

## 2018-11-20 PROCEDURE — 1101F PT FALLS ASSESS-DOCD LE1/YR: CPT | Performed by: INTERNAL MEDICINE

## 2018-11-20 PROCEDURE — G8417 CALC BMI ABV UP PARAM F/U: HCPCS | Performed by: INTERNAL MEDICINE

## 2018-11-20 PROCEDURE — 1036F TOBACCO NON-USER: CPT | Performed by: INTERNAL MEDICINE

## 2018-11-20 NOTE — PROGRESS NOTES
Chief Complaint   Patient presents with    Follow Up After Procedure     Loop recorder 11/1/18    Atrial Fibrillation     ekg 9/13/18 4-26-18: Patient presents for initial medical evaluation. Patient is followed on a regular basis by Dr. Michael Barton MD. Has been dealing with URI/bronchitis for the past week or so. Feeling tired, SOB. EKG today shows afibb at 111bpm. Does have orthopnea as well. No previous hx of afibb. No hx of MI, CHF or arrhythmias. No hx of LHC. Last stress test was 10 yrs ago. Pt denies chest pain,   nausea, vomiting, diarrhea, constipation, motor weakness, insomnia, weight loss, syncope, dizziness, lightheadedness, palpitations, PND, orthopnea, or claudication. + cough and nasal congestion. 5-3-18: feeling better overall, still SOB with mild exertion but much improved from last office visit. . s/p CXR with cardiomegaly and right pleural effusion. s/p ECHO with EF of 45%, mod LAE, Severe AS with Vmax of 4.3m/s, Peak of 74mmHg, mean of 38mmHg, AMARILYS 0.84cm2, RVSP of 35mmHg. BNP was 7000, lipid profile is ok, trop was negative. Renal function is normal. Diuresed well with lasix. HR and BP are controlled now. EKG with Afibb at 85bpm today. Pt denies chest pain,   nausea, vomiting, diarrhea, constipation, motor weakness, insomnia, weight loss, syncope, dizziness, lightheadedness, palpitations, PND, orthopnea. No LE discoloration or ulcers. + b/l  LE edema. Lipid profile is normal. No recent hospitalization. 5-15-18: s/p LHC with mild CAD, valve not able to be crossed but did not try hard to cross. s/p EMMANUEL with severe AS, EF of 35-40%, mild MR, mild AI, mild PHTN. s/p unsuccessful CV . EKG today with afibb at 84bpm. Pt denies chest pain, dyspnea, dyspnea on exertion, change in exercise capacity,  nausea, vomiting, diarrhea, constipation, motor weakness, insomnia, weight loss, syncope, dizziness, lightheadedness, palpitations, PND, orthopnea, or claudication.  He feels very tired and sleeps all the time. he has an appointment with [facility] for AV TAVR or surgery. On Eliquis and no bleeding issues. On amiodarone as well.     7-10-18: S/P TAVR AT  WITH KIRAN. WAS PLACED ON ASA/PLAVIX FOR ONE MONTH AND HE IS TO RESUME ELIQUIS AFTER. COREG WAS STOPPED AND PLACED ON CARDIZEM. HE IS NOT ON AMIODARONE. Pt denies chest pain, dyspnea, dyspnea on exertion, change in exercise capacity, fatigue,  nausea, vomiting, diarrhea, constipation, motor weakness, insomnia, weight loss, syncope, dizziness, lightheadedness, palpitations, PND, orthopnea, or claudication. NO ISSUES WITH THE PROCEDURE OTHER THAN UTI. No nitro use. BP and hr are good. CAD is stable. No LE discoloration or ulcers. No LE edema. No CHF type symptoms. Lipid profile is normal. ekg TODAY WITH AFIBB AT 90BPM.       8-23-18: HAS BEEN ON NOAC FOR AT LEAST 4 WEEKS. EKG today shows afibb with CVR. Pt denies chest pain, dyspnea, dyspnea on exertion, change in exercise capacity, fatigue,  nausea, vomiting, diarrhea, constipation, motor weakness, insomnia, weight loss, syncope, dizziness, lightheadedness, palpitations, PND, orthopnea, or claudication. No nitro use. BP and hr are good. CAD is stable. No LE discoloration or ulcers. No LE edema. No CHF type symptoms. Lipid profile is normal. No recent hospitalization. No change in meds. Uses a walker. Not able to lose weight. Does not want to have [facility] testing. 9-13-18: s/p hosp for Tikosin loading but did not tolerate, QTC was too long and was having 3-4 sec pauses. Placed on CCB and BBLocker. On NOAC, no bleeding issues. S/p CV into NSR. Was on amiodarone previously. EKG with NSR, LBBB. He does not feel really when he's in afibb. Feeling ok now.  Pt denies chest pain, dyspnea, dyspnea on exertion, change in exercise capacity, fatigue,  nausea, vomiting, diarrhea, constipation, motor weakness, insomnia, weight loss, syncope, dizziness, lightheadedness, palpitations, PND, orthopnea, or claudication. 11-20-18: s/p ILR. Healing well. Pt denies chest pain, dyspnea, dyspnea on exertion, change in exercise capacity, fatigue,  nausea, vomiting, diarrhea, constipation, motor weakness, insomnia, weight loss, syncope, dizziness, lightheadedness, palpitations, PND, orthopnea, or claudication. No nitro use. BP and hr are good. CAD is stable. No LE discoloration or ulcers. No LE edema. No CHF type symptoms. Lipid profile is normal. No recent hospitalization. No change in meds. On NOAC, no bleeding issues. Patient Active Problem List   Diagnosis    Colon polyps    Seasonal allergies    Essential hypertension    Morbid obesity due to excess calories ([facility] [address]    Irritable bowel syndrome without diarrhea    Anemia    Mixed hyperlipidemia    Persistent atrial fibrillation (HCC)    Cardiac murmur    Primary osteoarthritis involving multiple joints    S/P TAVR (transcatheter aortic valve replacement)    History of aortic stenosis    Atrial fibrillation and flutter (HCC)    Nonrheumatic aortic (valve) stenosis       Past Surgical History:   Procedure Laterality Date    AORTIC VALVE SURGERY  [id number]    DR. NG    CHOLECYSTECTOMY  03/2010    COLONOSCOPY  2009    INSERTABLE CARDIAC MONITOR  09/27/2018    DR. PARKER    INSERTABLE CARDIAC MONITOR  2018    KNEE SURGERY  1992       Social History     Social History    Marital status:      Spouse name: N/A    Number of children: N/A    Years of education: N/A     Social History Main Topics    Smoking status: Never Smoker    Smokeless tobacco: Never Used    Alcohol use No    Drug use: No    Sexual activity: Not Asked     Other Topics Concern    None     Social History Narrative    None       Family History   Problem Relation Age of Onset    Other Mother         BRAIN TUMOR       Current Outpatient Prescriptions   Medication Sig Dispense Refill    furosemide (LASIX) 40 MG tablet take 1 tablet by mouth once daily 30 tablet

## 2018-11-29 ENCOUNTER — TELEPHONE (OUTPATIENT)
Dept: CARDIOLOGY CLINIC | Age: 68
End: 2018-11-29

## 2018-12-07 RX ORDER — CYCLOBENZAPRINE HCL 10 MG
TABLET ORAL
Qty: 180 TABLET | Refills: 1 | Status: SHIPPED | OUTPATIENT
Start: 2018-12-07

## 2018-12-20 RX ORDER — FUROSEMIDE 40 MG/1
TABLET ORAL
Qty: 90 TABLET | Refills: 3 | Status: SHIPPED | OUTPATIENT
Start: 2018-12-20 | End: 2020-01-29

## 2018-12-27 DIAGNOSIS — M15.9 PRIMARY OSTEOARTHRITIS INVOLVING MULTIPLE JOINTS: ICD-10-CM

## 2018-12-27 NOTE — TELEPHONE ENCOUNTER
Pharmacy requests refill on medication.  Please approve or deny this request.    LOV 10/18/2018    Last refill 10/18/18    Future Appointments  Date Time Provider Fernando Hernandez   4/11/2019 8:15 AM Thom Navarrete MD [facility], THE [facility] AT [place]   5/23/2019 10:15 AM DO Arben Gordon [id number]

## 2018-12-28 RX ORDER — TRAMADOL HYDROCHLORIDE 50 MG/1
50 TABLET ORAL EVERY 6 HOURS PRN
Qty: 120 TABLET | Refills: 0 | Status: SHIPPED | OUTPATIENT
Start: 2018-12-28 | End: 2019-01-27

## 2019-02-14 ENCOUNTER — OFFICE VISIT (OUTPATIENT)
Dept: CARDIOLOGY CLINIC | Age: 69
End: 2019-02-14
Payer: MEDICARE

## 2019-02-14 VITALS
OXYGEN SATURATION: 98 % | HEIGHT: 68 IN | SYSTOLIC BLOOD PRESSURE: 140 MMHG | DIASTOLIC BLOOD PRESSURE: 82 MMHG | BODY MASS INDEX: 60.97 KG/M2 | HEART RATE: 76 BPM | RESPIRATION RATE: 22 BRPM

## 2019-02-14 DIAGNOSIS — E66.01 MORBID OBESITY WITH BMI OF 60.0-69.9, ADULT (HCC): ICD-10-CM

## 2019-02-14 DIAGNOSIS — Z95.2 S/P TAVR (TRANSCATHETER AORTIC VALVE REPLACEMENT): ICD-10-CM

## 2019-02-14 DIAGNOSIS — I48.91 ATRIAL FIBRILLATION AND FLUTTER (HCC): Primary | ICD-10-CM

## 2019-02-14 DIAGNOSIS — I35.0 NONRHEUMATIC AORTIC (VALVE) STENOSIS: ICD-10-CM

## 2019-02-14 DIAGNOSIS — E78.2 MIXED HYPERLIPIDEMIA: ICD-10-CM

## 2019-02-14 DIAGNOSIS — E66.01 MORBID OBESITY DUE TO EXCESS CALORIES (HCC): ICD-10-CM

## 2019-02-14 DIAGNOSIS — Z86.79 HISTORY OF CARDIOMYOPATHY: ICD-10-CM

## 2019-02-14 DIAGNOSIS — I10 ESSENTIAL HYPERTENSION: ICD-10-CM

## 2019-02-14 DIAGNOSIS — I48.92 ATRIAL FIBRILLATION AND FLUTTER (HCC): Primary | ICD-10-CM

## 2019-02-14 DIAGNOSIS — I48.19 PERSISTENT ATRIAL FIBRILLATION (HCC): ICD-10-CM

## 2019-02-14 DIAGNOSIS — I49.5 SSS (SICK SINUS SYNDROME) (HCC): ICD-10-CM

## 2019-02-14 PROCEDURE — 4040F PNEUMOC VAC/ADMIN/RCVD: CPT | Performed by: INTERNAL MEDICINE

## 2019-02-14 PROCEDURE — 3017F COLORECTAL CA SCREEN DOC REV: CPT | Performed by: INTERNAL MEDICINE

## 2019-02-14 PROCEDURE — G8482 FLU IMMUNIZE ORDER/ADMIN: HCPCS | Performed by: INTERNAL MEDICINE

## 2019-02-14 PROCEDURE — G8427 DOCREV CUR MEDS BY ELIG CLIN: HCPCS | Performed by: INTERNAL MEDICINE

## 2019-02-14 PROCEDURE — G8417 CALC BMI ABV UP PARAM F/U: HCPCS | Performed by: INTERNAL MEDICINE

## 2019-02-14 PROCEDURE — 1123F ACP DISCUSS/DSCN MKR DOCD: CPT | Performed by: INTERNAL MEDICINE

## 2019-02-14 PROCEDURE — 93000 ELECTROCARDIOGRAM COMPLETE: CPT | Performed by: INTERNAL MEDICINE

## 2019-02-14 PROCEDURE — 1101F PT FALLS ASSESS-DOCD LE1/YR: CPT | Performed by: INTERNAL MEDICINE

## 2019-02-14 PROCEDURE — 99214 OFFICE O/P EST MOD 30 MIN: CPT | Performed by: INTERNAL MEDICINE

## 2019-02-14 PROCEDURE — 1036F TOBACCO NON-USER: CPT | Performed by: INTERNAL MEDICINE

## 2019-02-19 LAB
ANION GAP SERPL CALCULATED.3IONS-SCNC: 12 MMOL/L (ref 10–20)
BICARBONATE: 28 MMOL/L (ref 21–32)
BUN / CREAT RATIO: 25 (ref 5–25)
CALCIUM SERPL-MCNC: 9.4 MG/DL (ref 8.6–10.3)
CHLORIDE BLD-SCNC: 102 MMOL/L (ref 98–107)
CREAT SERPL-MCNC: 0.83 MG/DL (ref 0.5–1.3)
ERYTHROCYTE [DISTWIDTH] IN BLOOD BY AUTOMATED COUNT: 12.4 % (ref 12–15.4)
ERYTHROCYTE [DISTWIDTH] IN BLOOD BY AUTOMATED COUNT: 46.5 FL (ref 39.3–48.6)
GFR CALCULATED: >60
GLUCOSE: 107 MG/DL (ref 70–100)
HCT VFR BLD CALC: 39.1 % (ref 38.4–54.9)
HEMOGLOBIN: 12.9 G/DL (ref 12.8–17.7)
INR BLD: 1.17 (ref 0.9–1.1)
MCH RBC QN AUTO: 33.5 PG (ref 27.5–32.9)
MCHC RBC AUTO-ENTMCNC: 33 G/DL (ref 30.5–35.4)
MCV RBC AUTO: 101.6 FL (ref 83.3–98.2)
NUCLEATED RBCS: 0 /100{WBCS}
PARTIAL THROMBOPLASTIN TIME: 39.4 SEC (ref 28–38)
PLATELET # BLD: 187 10*3/UL (ref 155–404)
PMV BLD AUTO: 10 FL (ref 9.9–12.1)
POTASSIUM SERPL-SCNC: 3.7 MMOL/L (ref 3.5–5.1)
PROTHROMBIN TIME: 13.3 SEC (ref 9.7–12.7)
RBC: 3.85 10*6/UL (ref 4.08–6.37)
RBCS COUNTED: 0 10*3/UL
SODIUM BLD-SCNC: 138 MMOL/L (ref 136–145)
UREA NITROGEN: 21 MG/DL (ref 6–23)
WBC: 7.9 10*3/UL (ref 4.2–11)

## 2019-02-20 LAB
ANION GAP SERPL CALCULATED.3IONS-SCNC: 12 MMOL/L (ref 10–20)
BICARBONATE: 29 MMOL/L (ref 21–32)
BUN / CREAT RATIO: 27 (ref 5–25)
CALCIUM SERPL-MCNC: 8.9 MG/DL (ref 8.6–10.3)
CHLORIDE BLD-SCNC: 104 MMOL/L (ref 98–107)
CREAT SERPL-MCNC: 0.71 MG/DL (ref 0.5–1.3)
ERYTHROCYTE [DISTWIDTH] IN BLOOD BY AUTOMATED COUNT: 12.6 % (ref 12–15.4)
ERYTHROCYTE [DISTWIDTH] IN BLOOD BY AUTOMATED COUNT: 47.8 FL (ref 39.3–48.6)
GFR CALCULATED: >60
GLUCOSE: 89 MG/DL (ref 70–100)
HCT VFR BLD CALC: 37.6 % (ref 38.4–54.9)
HEMOGLOBIN: 12.2 G/DL (ref 12.8–17.7)
MCH RBC QN AUTO: 33.2 PG (ref 27.5–32.9)
MCHC RBC AUTO-ENTMCNC: 32.4 G/DL (ref 30.5–35.4)
MCV RBC AUTO: 102.2 FL (ref 83.3–98.2)
NUCLEATED RBCS: 0 /100{WBCS}
PLATELET # BLD: 189 10*3/UL (ref 155–404)
PMV BLD AUTO: 9.9 FL (ref 9.9–12.1)
POTASSIUM SERPL-SCNC: 3.6 MMOL/L (ref 3.5–5.1)
RBC: 3.68 10*6/UL (ref 4.08–6.37)
RBCS COUNTED: 0 10*3/UL
SODIUM BLD-SCNC: 141 MMOL/L (ref 136–145)
UREA NITROGEN: 19 MG/DL (ref 6–23)
WBC: 8.4 10*3/UL (ref 4.2–11)

## 2019-02-22 RX ORDER — IRBESARTAN 150 MG/1
TABLET ORAL
Qty: 30 TABLET | Refills: 0 | Status: SHIPPED | OUTPATIENT
Start: 2019-02-22 | End: 2019-03-21 | Stop reason: SDUPTHER

## 2019-02-27 ENCOUNTER — TELEPHONE (OUTPATIENT)
Dept: FAMILY MEDICINE CLINIC | Age: 69
End: 2019-02-27

## 2019-03-12 ENCOUNTER — OFFICE VISIT (OUTPATIENT)
Dept: CARDIOLOGY CLINIC | Age: 69
End: 2019-03-12
Payer: MEDICARE

## 2019-03-12 VITALS — DIASTOLIC BLOOD PRESSURE: 80 MMHG | TEMPERATURE: 98 F | SYSTOLIC BLOOD PRESSURE: 150 MMHG | HEART RATE: 91 BPM

## 2019-03-12 DIAGNOSIS — E78.2 MIXED HYPERLIPIDEMIA: ICD-10-CM

## 2019-03-12 DIAGNOSIS — Z95.0 PACEMAKER: ICD-10-CM

## 2019-03-12 DIAGNOSIS — I48.19 PERSISTENT ATRIAL FIBRILLATION (HCC): Primary | ICD-10-CM

## 2019-03-12 DIAGNOSIS — I48.91 ATRIAL FIBRILLATION AND FLUTTER (HCC): ICD-10-CM

## 2019-03-12 DIAGNOSIS — Z95.2 S/P TAVR (TRANSCATHETER AORTIC VALVE REPLACEMENT): ICD-10-CM

## 2019-03-12 DIAGNOSIS — I48.92 ATRIAL FIBRILLATION AND FLUTTER (HCC): ICD-10-CM

## 2019-03-12 DIAGNOSIS — E66.01 MORBID OBESITY DUE TO EXCESS CALORIES (HCC): ICD-10-CM

## 2019-03-12 DIAGNOSIS — I10 ESSENTIAL HYPERTENSION: ICD-10-CM

## 2019-03-12 PROCEDURE — G8427 DOCREV CUR MEDS BY ELIG CLIN: HCPCS | Performed by: INTERNAL MEDICINE

## 2019-03-12 PROCEDURE — 4040F PNEUMOC VAC/ADMIN/RCVD: CPT | Performed by: INTERNAL MEDICINE

## 2019-03-12 PROCEDURE — G8482 FLU IMMUNIZE ORDER/ADMIN: HCPCS | Performed by: INTERNAL MEDICINE

## 2019-03-12 PROCEDURE — 3017F COLORECTAL CA SCREEN DOC REV: CPT | Performed by: INTERNAL MEDICINE

## 2019-03-12 PROCEDURE — 93000 ELECTROCARDIOGRAM COMPLETE: CPT | Performed by: INTERNAL MEDICINE

## 2019-03-12 PROCEDURE — 1036F TOBACCO NON-USER: CPT | Performed by: INTERNAL MEDICINE

## 2019-03-12 PROCEDURE — 1123F ACP DISCUSS/DSCN MKR DOCD: CPT | Performed by: INTERNAL MEDICINE

## 2019-03-12 PROCEDURE — G8417 CALC BMI ABV UP PARAM F/U: HCPCS | Performed by: INTERNAL MEDICINE

## 2019-03-12 PROCEDURE — 99214 OFFICE O/P EST MOD 30 MIN: CPT | Performed by: INTERNAL MEDICINE

## 2019-03-12 PROCEDURE — 1101F PT FALLS ASSESS-DOCD LE1/YR: CPT | Performed by: INTERNAL MEDICINE

## 2019-03-12 RX ORDER — AMOXICILLIN 500 MG/1
CAPSULE ORAL
Qty: 4 CAPSULE | Refills: 0 | Status: SHIPPED | OUTPATIENT
Start: 2019-03-12 | End: 2021-05-04

## 2019-03-21 ENCOUNTER — OFFICE VISIT (OUTPATIENT)
Dept: FAMILY MEDICINE CLINIC | Age: 69
End: 2019-03-21
Payer: MEDICARE

## 2019-03-21 ENCOUNTER — TELEPHONE (OUTPATIENT)
Dept: FAMILY MEDICINE CLINIC | Age: 69
End: 2019-03-21

## 2019-03-21 VITALS
RESPIRATION RATE: 18 BRPM | SYSTOLIC BLOOD PRESSURE: 128 MMHG | OXYGEN SATURATION: 98 % | HEIGHT: 68 IN | DIASTOLIC BLOOD PRESSURE: 74 MMHG | TEMPERATURE: 97.8 F | HEART RATE: 81 BPM | BODY MASS INDEX: 60.97 KG/M2

## 2019-03-21 DIAGNOSIS — M15.9 PRIMARY OSTEOARTHRITIS INVOLVING MULTIPLE JOINTS: ICD-10-CM

## 2019-03-21 DIAGNOSIS — K58.9 IRRITABLE BOWEL SYNDROME WITHOUT DIARRHEA: ICD-10-CM

## 2019-03-21 DIAGNOSIS — I48.91 ATRIAL FIBRILLATION AND FLUTTER (HCC): Primary | ICD-10-CM

## 2019-03-21 DIAGNOSIS — I35.0 NONRHEUMATIC AORTIC (VALVE) STENOSIS: ICD-10-CM

## 2019-03-21 DIAGNOSIS — J30.2 SEASONAL ALLERGIES: ICD-10-CM

## 2019-03-21 DIAGNOSIS — E78.2 MIXED HYPERLIPIDEMIA: ICD-10-CM

## 2019-03-21 DIAGNOSIS — I10 ESSENTIAL HYPERTENSION: ICD-10-CM

## 2019-03-21 DIAGNOSIS — I48.92 ATRIAL FIBRILLATION AND FLUTTER (HCC): Primary | ICD-10-CM

## 2019-03-21 DIAGNOSIS — I48.19 PERSISTENT ATRIAL FIBRILLATION (HCC): ICD-10-CM

## 2019-03-21 DIAGNOSIS — E66.01 MORBID OBESITY DUE TO EXCESS CALORIES (HCC): ICD-10-CM

## 2019-03-21 PROCEDURE — G8417 CALC BMI ABV UP PARAM F/U: HCPCS | Performed by: FAMILY MEDICINE

## 2019-03-21 PROCEDURE — 4040F PNEUMOC VAC/ADMIN/RCVD: CPT | Performed by: FAMILY MEDICINE

## 2019-03-21 PROCEDURE — 1123F ACP DISCUSS/DSCN MKR DOCD: CPT | Performed by: FAMILY MEDICINE

## 2019-03-21 PROCEDURE — 1036F TOBACCO NON-USER: CPT | Performed by: FAMILY MEDICINE

## 2019-03-21 PROCEDURE — 99214 OFFICE O/P EST MOD 30 MIN: CPT | Performed by: FAMILY MEDICINE

## 2019-03-21 PROCEDURE — G8427 DOCREV CUR MEDS BY ELIG CLIN: HCPCS | Performed by: FAMILY MEDICINE

## 2019-03-21 PROCEDURE — 3017F COLORECTAL CA SCREEN DOC REV: CPT | Performed by: FAMILY MEDICINE

## 2019-03-21 PROCEDURE — G8482 FLU IMMUNIZE ORDER/ADMIN: HCPCS | Performed by: FAMILY MEDICINE

## 2019-03-21 PROCEDURE — 1101F PT FALLS ASSESS-DOCD LE1/YR: CPT | Performed by: FAMILY MEDICINE

## 2019-03-21 RX ORDER — IRBESARTAN 150 MG/1
TABLET ORAL
Qty: 30 TABLET | Refills: 0 | Status: SHIPPED | OUTPATIENT
Start: 2019-03-21

## 2019-03-21 RX ORDER — TERAZOSIN 10 MG/1
10 CAPSULE ORAL DAILY
Qty: 90 CAPSULE | Refills: 3 | Status: SHIPPED | OUTPATIENT
Start: 2019-03-21 | End: 2022-05-31

## 2019-03-21 RX ORDER — HYDROXYZINE HYDROCHLORIDE 25 MG/1
25 TABLET, FILM COATED ORAL 3 TIMES DAILY PRN
Qty: 30 TABLET | Refills: 2 | Status: SHIPPED | OUTPATIENT
Start: 2019-03-21 | End: 2020-03-20

## 2019-03-21 ASSESSMENT — PATIENT HEALTH QUESTIONNAIRE - PHQ9
SUM OF ALL RESPONSES TO PHQ QUESTIONS 1-9: 0
SUM OF ALL RESPONSES TO PHQ9 QUESTIONS 1 & 2: 0
2. FEELING DOWN, DEPRESSED OR HOPELESS: 0
1. LITTLE INTEREST OR PLEASURE IN DOING THINGS: 0
SUM OF ALL RESPONSES TO PHQ QUESTIONS 1-9: 0

## 2019-05-30 ENCOUNTER — OFFICE VISIT (OUTPATIENT)
Dept: CARDIOLOGY CLINIC | Age: 69
End: 2019-05-30
Payer: MEDICARE

## 2019-05-30 VITALS
HEIGHT: 68 IN | BODY MASS INDEX: 60.97 KG/M2 | HEART RATE: 61 BPM | DIASTOLIC BLOOD PRESSURE: 82 MMHG | OXYGEN SATURATION: 98 % | SYSTOLIC BLOOD PRESSURE: 124 MMHG | RESPIRATION RATE: 22 BRPM

## 2019-05-30 DIAGNOSIS — E78.2 MIXED HYPERLIPIDEMIA: ICD-10-CM

## 2019-05-30 DIAGNOSIS — I10 ESSENTIAL HYPERTENSION: ICD-10-CM

## 2019-05-30 DIAGNOSIS — I48.92 ATRIAL FIBRILLATION AND FLUTTER (HCC): ICD-10-CM

## 2019-05-30 DIAGNOSIS — Z95.0 PACEMAKER: ICD-10-CM

## 2019-05-30 DIAGNOSIS — I48.91 ATRIAL FIBRILLATION AND FLUTTER (HCC): ICD-10-CM

## 2019-05-30 DIAGNOSIS — Z86.79 HISTORY OF AORTIC STENOSIS: ICD-10-CM

## 2019-05-30 DIAGNOSIS — Z95.2 S/P TAVR (TRANSCATHETER AORTIC VALVE REPLACEMENT): Primary | ICD-10-CM

## 2019-05-30 PROCEDURE — 1123F ACP DISCUSS/DSCN MKR DOCD: CPT | Performed by: INTERNAL MEDICINE

## 2019-05-30 PROCEDURE — G8427 DOCREV CUR MEDS BY ELIG CLIN: HCPCS | Performed by: INTERNAL MEDICINE

## 2019-05-30 PROCEDURE — 99213 OFFICE O/P EST LOW 20 MIN: CPT | Performed by: INTERNAL MEDICINE

## 2019-05-30 PROCEDURE — G8417 CALC BMI ABV UP PARAM F/U: HCPCS | Performed by: INTERNAL MEDICINE

## 2019-05-30 PROCEDURE — 4040F PNEUMOC VAC/ADMIN/RCVD: CPT | Performed by: INTERNAL MEDICINE

## 2019-05-30 PROCEDURE — 3017F COLORECTAL CA SCREEN DOC REV: CPT | Performed by: INTERNAL MEDICINE

## 2019-05-30 PROCEDURE — 1036F TOBACCO NON-USER: CPT | Performed by: INTERNAL MEDICINE

## 2019-05-30 RX ORDER — METOPROLOL TARTRATE 50 MG/1
50 TABLET, FILM COATED ORAL 2 TIMES DAILY
Qty: 180 TABLET | Refills: 3 | Status: SHIPPED | OUTPATIENT
Start: 2019-05-30 | End: 2020-04-09

## 2019-05-30 NOTE — PROGRESS NOTES
Chief Complaint   Patient presents with    6 Month Follow-Up    Atrial Fibrillation       4-26-18: Patient presents for initial medical evaluation. Patient is followed on a regular basis by Dr. Dorinda Urban MD. Has been dealing with URI/bronchitis for the past week or so. Feeling tired, SOB. EKG today shows afibb at 111bpm. Does have orthopnea as well. No previous hx of afibb. No hx of MI, CHF or arrhythmias. No hx of LHC. Last stress test was 10 yrs ago. Pt denies chest pain,   nausea, vomiting, diarrhea, constipation, motor weakness, insomnia, weight loss, syncope, dizziness, lightheadedness, palpitations, PND, orthopnea, or claudication. + cough and nasal congestion. 5-3-18: feeling better overall, still SOB with mild exertion but much improved from last office visit. . s/p CXR with cardiomegaly and right pleural effusion. s/p ECHO with EF of 45%, mod LAE, Severe AS with Vmax of 4.3m/s, Peak of 74mmHg, mean of 38mmHg, AMARILYS 0.84cm2, RVSP of 35mmHg. BNP was 7000, lipid profile is ok, trop was negative. Renal function is normal. Diuresed well with lasix. HR and BP are controlled now. EKG with Afibb at 85bpm today. Pt denies chest pain,   nausea, vomiting, diarrhea, constipation, motor weakness, insomnia, weight loss, syncope, dizziness, lightheadedness, palpitations, PND, orthopnea. No LE discoloration or ulcers. + b/l  LE edema. Lipid profile is normal. No recent hospitalization. 5-15-18: s/p LHC with mild CAD, valve not able to be crossed but did not try hard to cross. s/p EMMANUEL with severe AS, EF of 35-40%, mild MR, mild AI, mild PHTN. s/p unsuccessful CV . EKG today with afibb at 84bpm. Pt denies chest pain, dyspnea, dyspnea on exertion, change in exercise capacity,  nausea, vomiting, diarrhea, constipation, motor weakness, insomnia, weight loss, syncope, dizziness, lightheadedness, palpitations, PND, orthopnea, or claudication. He feels very tired and sleeps all the time. he has an appointment with  for AV TAVR or surgery. On Eliquis and no bleeding issues. On amiodarone as well.     7-10-18: S/P TAVR AT  WITH KIRAN. WAS PLACED ON ASA/PLAVIX FOR ONE MONTH AND HE IS TO RESUME ELIQUIS AFTER. COREG WAS STOPPED AND PLACED ON CARDIZEM. HE IS NOT ON AMIODARONE. Pt denies chest pain, dyspnea, dyspnea on exertion, change in exercise capacity, fatigue,  nausea, vomiting, diarrhea, constipation, motor weakness, insomnia, weight loss, syncope, dizziness, lightheadedness, palpitations, PND, orthopnea, or claudication. NO ISSUES WITH THE PROCEDURE OTHER THAN UTI. No nitro use. BP and hr are good. CAD is stable. No LE discoloration or ulcers. No LE edema. No CHF type symptoms. Lipid profile is normal. ekg TODAY WITH AFIBB AT 90BPM.       8-23-18: HAS BEEN ON NOAC FOR AT LEAST 4 WEEKS. EKG today shows afibb with CVR. Pt denies chest pain, dyspnea, dyspnea on exertion, change in exercise capacity, fatigue,  nausea, vomiting, diarrhea, constipation, motor weakness, insomnia, weight loss, syncope, dizziness, lightheadedness, palpitations, PND, orthopnea, or claudication. No nitro use. BP and hr are good. CAD is stable. No LE discoloration or ulcers. No LE edema. No CHF type symptoms. Lipid profile is normal. No recent hospitalization. No change in meds. Uses a walker. Not able to lose weight. Does not want to have [facility] testing. 9-13-18: s/p hosp for Tikosin loading but did not tolerate, QTC was too long and was having 3-4 sec pauses. Placed on CCB and BBLocker. On NOAC, no bleeding issues. S/p CV into NSR. Was on amiodarone previously. EKG with NSR, LBBB. He does not feel really when he's in afibb. Feeling ok now. Pt denies chest pain, dyspnea, dyspnea on exertion, change in exercise capacity, fatigue,  nausea, vomiting, diarrhea, constipation, motor weakness, insomnia, weight loss, syncope, dizziness, lightheadedness, palpitations, PND, orthopnea, or claudication. 11-20-18: s/p ILR. Healing well.  Pt denies chest dyspnea on exertion, orthopnea and shortness of breath  Gastrointestinal ROS: no abdominal pain, change in bowel habits, or black or bloody stools  Genito-Urinary ROS: no dysuria, trouble voiding, or hematuria  Musculoskeletal ROS: negative  Neurological ROS: no TIA or stroke symptoms  Dermatological ROS: negative    VITALS:  Blood pressure 124/82, pulse 61, resp. rate 22, height 5' 8\" (1.727 m), SpO2 98 %. Body mass index is 60.97 kg/m². Physical Examination:  General appearance - alert, well appearing, and in no distress and overweight  Mental status - alert, oriented to person, place, and time  Neck - Neck is supple, no JVD or carotid bruits. No thyromegaly or adenopathy. Chest - clear to auscultation, no wheezes, rales or rhonchi, symmetric air entry  Heart - irregularly irregular rhythm with rate 100. + murmur, 3/6 systolic right sb. Abdomen - soft, nontender, nondistended, no masses or organomegaly  Neurological - alert, oriented, normal speech, no focal findings or movement disorder noted  Extremities - peripheral pulses normal, no pedal edema, no clubbing or cyanosis  Skin - normal coloration and turgor, no rashes, no suspicious skin lesions noted  Pacer site looks good. EKG: NSR. No orders of the defined types were placed in this encounter. ASSESSMENT:     Diagnosis Orders   1. S/P TAVR (transcatheter aortic valve replacement)     2. Pacemaker- dual chamber MRI compatible. 3. Essential hypertension     4. Mixed hyperlipidemia     5. Atrial fibrillation and flutter ([address]     6. History of aortic stenosis           PLAN:     Patient will need to continue to follow up with you for their general medical care     As always, aggressive risk factor modification is strongly recommended. We should adhere to the JNC VIII guidelines for HTN management and the NCEP ATP III guidelines for LDL-C management.     Cardiac diet is always recommended with low fat, cholesterol, calories and sodium. Continue medications at current doses. Does not want to have [facility] testing. Consider amiodarone or sotalol in future post PPM. Will await next PPM check. Follow up with pacer clinic. Multiple signs and symptoms suggestive of sleep apnea, will consider sleep study after cardiac evaluation is completed    Patient was advised and encouraged to check blood pressure at home or at a pharmacy, maintain a logbook, and also call us back if blood pressure are above the target ranges or if it is low. Patient clearly understands and agrees to the instructions. We will need to continue to monitor muscle and liver enzymes, BUN, CR, and electrolytes. Details of medical condition explained and patient was warned about adverse consequences of uncontrolled medical conditions and possible side effects of prescribed medications.

## 2019-06-21 ENCOUNTER — PATIENT MESSAGE (OUTPATIENT)
Dept: CARDIOLOGY CLINIC | Age: 69
End: 2019-06-21

## 2019-09-13 RX ORDER — DILTIAZEM HYDROCHLORIDE 180 MG/1
CAPSULE, EXTENDED RELEASE ORAL
Qty: 90 CAPSULE | Refills: 3 | Status: SHIPPED | OUTPATIENT
Start: 2019-09-13 | End: 2020-09-21

## 2019-09-17 ENCOUNTER — OFFICE VISIT (OUTPATIENT)
Dept: CARDIOLOGY CLINIC | Age: 69
End: 2019-09-17
Payer: MEDICARE

## 2019-09-17 VITALS
SYSTOLIC BLOOD PRESSURE: 140 MMHG | DIASTOLIC BLOOD PRESSURE: 90 MMHG | WEIGHT: 315 LBS | HEIGHT: 68 IN | BODY MASS INDEX: 47.74 KG/M2 | HEART RATE: 89 BPM

## 2019-09-17 DIAGNOSIS — I48.19 PERSISTENT ATRIAL FIBRILLATION (HCC): Primary | ICD-10-CM

## 2019-09-17 PROCEDURE — G8427 DOCREV CUR MEDS BY ELIG CLIN: HCPCS | Performed by: INTERNAL MEDICINE

## 2019-09-17 PROCEDURE — G8417 CALC BMI ABV UP PARAM F/U: HCPCS | Performed by: INTERNAL MEDICINE

## 2019-09-17 PROCEDURE — 1123F ACP DISCUSS/DSCN MKR DOCD: CPT | Performed by: INTERNAL MEDICINE

## 2019-09-17 PROCEDURE — 4040F PNEUMOC VAC/ADMIN/RCVD: CPT | Performed by: INTERNAL MEDICINE

## 2019-09-17 PROCEDURE — 3017F COLORECTAL CA SCREEN DOC REV: CPT | Performed by: INTERNAL MEDICINE

## 2019-09-17 PROCEDURE — 99214 OFFICE O/P EST MOD 30 MIN: CPT | Performed by: INTERNAL MEDICINE

## 2019-09-17 PROCEDURE — 1036F TOBACCO NON-USER: CPT | Performed by: INTERNAL MEDICINE

## 2019-09-17 PROCEDURE — 93000 ELECTROCARDIOGRAM COMPLETE: CPT | Performed by: INTERNAL MEDICINE

## 2019-09-17 NOTE — PROGRESS NOTES
Pt denies chest pain, dyspnea, dyspnea on exertion, change in exercise capacity, fatigue,  nausea, vomiting, diarrhea, constipation, motor weakness, insomnia, weight loss, syncope, dizziness, lightheadedness, palpitations, PND, orthopnea, or claudication. No nitro use. BP and hr are good. CAD is stable. No LE discoloration or ulcers. No LE edema. No CHF type symptoms. Lipid profile is normal. No recent hospitalization. No change in meds. On NOAC, no bleeding issues. 19: s/p ILR with 13 pause in  and 18 sec pause in  during the day. States he has been sleeping more than usual. Remains on NOAC, no bleeding issues. Pt denies chest pain, dyspnea, dyspnea on exertion, change in exercise capacity, fatigue,  nausea, vomiting, diarrhea, constipation, motor weakness, insomnia, weight loss, syncope, dizziness, lightheadedness, palpitations, PND, orthopnea, or claudication. No nitro use. BP and hr are good. CAD is stable. No LE discoloration or ulcers. No LE edema. No CHF type symptoms. Lipid profile is normal.    3-12-19: s/p PPM dual chamber and ILR removal. S/p ECHO with normal LVF. Needs to have dental work and amoxicillin script. Pt denies chest pain, dyspnea, dyspnea on exertion, change in exercise capacity, fatigue,  nausea, vomiting, diarrhea, constipation, motor weakness, insomnia, weight loss, syncope, dizziness, lightheadedness, palpitations, PND, orthopnea, or claudication. On DOAC. No bleeding issues. Pacer healing well. EKG with NSR.     19: his wife  2 weeks ago. He is having a hard time sleeping. Hx of Pafib, on DOAC, no bleeding issues. Pt denies chest pain, dyspnea, dyspnea on exertion, change in exercise capacity, fatigue,  nausea, vomiting, diarrhea, constipation, motor weakness, insomnia, weight loss, syncope, dizziness, lightheadedness, palpitations, PND, orthopnea, or claudication. No nitro use. BP and hr are good. CAD is stable. No LE discoloration or ulcers. No LE edema. No CHF type symptoms. Lipid profile is normal. No recent hospitalization. No change in meds. 19: Pt denies chest pain, dyspnea, dyspnea on exertion, change in exercise capacity, fatigue,  nausea, vomiting, diarrhea, constipation, motor weakness, insomnia, weight loss, syncope, dizziness, lightheadedness, palpitations, PND, orthopnea, or claudication. No nitro use. BP and hr are good. CAD is stable. No LE discoloration or ulcers. No LE edema. No CHF type symptoms. Lipid profile is normal. No recent hospitalization. No change in meds. Has lost 40 pounds since his wife . EKG with ? afib or NSR. S/p PPM check in 19 with 0% afib burden. Patient Active Problem List   Diagnosis    Colon polyps    Seasonal allergies    Essential hypertension    Morbid obesity due to excess calories ([facility] [address]    Irritable bowel syndrome without diarrhea    Anemia    Mixed hyperlipidemia    Persistent atrial fibrillation (HCC)    Cardiac murmur    Primary osteoarthritis involving multiple joints    S/P TAVR (transcatheter aortic valve replacement)    History of aortic stenosis    Atrial fibrillation and flutter (HCC)    Nonrheumatic aortic (valve) stenosis    Pacemaker- dual chamber MRI compatible. Past Surgical History:   Procedure Laterality Date    AORTIC VALVE SURGERY  [id number]    DR. NG    CHOLECYSTECTOMY  2010    COLONOSCOPY      INSERTABLE CARDIAC MONITOR  2018    DR. Singh      KNEE SURGERY  1992    PACEMAKER INSERTION  2019    DR. PARKER       Social History     Socioeconomic History    Marital status:      Spouse name: Not on file    Number of children: Not on file    Years of education: Not on file    Highest education level: Not on file   Occupational History    Not on file   Social Needs    Financial resource strain: Not on file    Food insecurity:     Worry: Not on file     Inability: Not on file   [facility] irbesartan (AVAPRO) 150 MG tablet       Blood Pressure Monitor MISC 1 kit by Does not apply route 2 times daily 1 each 0    metroNIDAZOLE (METROGEL) 0.75 % gel       triamcinolone (KENALOG) 0.1 % cream Apply topically 2 times daily. 45 g 1     Current Facility-Administered Medications   Medication Dose Route Frequency Provider Last Rate Last Dose    cyanocobalamin injection 1,000 mcg  1,000 mcg Intramuscular Once Meli Casiano MD           Adhesive tape    Review of Systems:  General ROS: positive for  - fatigue  Psychological ROS: negative  Hematological and Lymphatic ROS: No history of blood clots or bleeding disorder. Respiratory ROS: positive for - cough and shortness of breath  Cardiovascular ROS: positive for - dyspnea on exertion, orthopnea and shortness of breath  Gastrointestinal ROS: no abdominal pain, change in bowel habits, or black or bloody stools  Genito-Urinary ROS: no dysuria, trouble voiding, or hematuria  Musculoskeletal ROS: negative  Neurological ROS: no TIA or stroke symptoms  Dermatological ROS: negative    VITALS:  Blood pressure (!) 140/90, pulse 89, height 5' 8\" (1.727 m), weight (!) 375 lb 12.8 oz (170.5 kg). Body mass index is 57.14 kg/m². Physical Examination:  General appearance - alert, well appearing, and in no distress and overweight  Mental status - alert, oriented to person, place, and time  Neck - Neck is supple, no JVD or carotid bruits. No thyromegaly or adenopathy. Chest - clear to auscultation, no wheezes, rales or rhonchi, symmetric air entry  Heart - irregularly irregular rhythm with rate 100. + murmur, 3/6 systolic right sb.    Abdomen - soft, nontender, nondistended, no masses or organomegaly  Neurological - alert, oriented, normal speech, no focal findings or movement disorder noted  Extremities - peripheral pulses normal, no pedal edema, no clubbing or cyanosis  Skin - normal coloration and turgor, no rashes, no suspicious skin lesions noted  Pacer site

## 2020-01-29 NOTE — TELEPHONE ENCOUNTER
Pharmacy requesting medication refill.  Please approve or deny this request.    Rx requested:  Requested Prescriptions     Pending Prescriptions Disp Refills    furosemide (LASIX) 40 MG tablet [Pharmacy Med Name: FUROSEMIDE TABS 40MG] 90 tablet 4     Sig: TAKE 1 TABLET DAILY         Last Office Visit:   9/17/2019      Next Visit Date:  Future Appointments   Date Time Provider Fernando Hernandez   3/12/2020 10:00 AM [address] Michelle [place]

## 2020-01-31 RX ORDER — FUROSEMIDE 40 MG/1
TABLET ORAL
Qty: 90 TABLET | Refills: 3 | Status: SHIPPED | OUTPATIENT
Start: 2020-01-31 | End: 2021-01-11

## 2020-03-12 ENCOUNTER — OFFICE VISIT (OUTPATIENT)
Dept: CARDIOLOGY CLINIC | Age: 70
End: 2020-03-12
Payer: MEDICARE

## 2020-03-12 VITALS
HEART RATE: 90 BPM | DIASTOLIC BLOOD PRESSURE: 80 MMHG | BODY MASS INDEX: 58.63 KG/M2 | WEIGHT: 315 LBS | SYSTOLIC BLOOD PRESSURE: 140 MMHG

## 2020-03-12 PROCEDURE — G8484 FLU IMMUNIZE NO ADMIN: HCPCS | Performed by: INTERNAL MEDICINE

## 2020-03-12 PROCEDURE — 93000 ELECTROCARDIOGRAM COMPLETE: CPT | Performed by: INTERNAL MEDICINE

## 2020-03-12 PROCEDURE — G8417 CALC BMI ABV UP PARAM F/U: HCPCS | Performed by: INTERNAL MEDICINE

## 2020-03-12 PROCEDURE — 99213 OFFICE O/P EST LOW 20 MIN: CPT | Performed by: INTERNAL MEDICINE

## 2020-03-12 PROCEDURE — 4040F PNEUMOC VAC/ADMIN/RCVD: CPT | Performed by: INTERNAL MEDICINE

## 2020-03-12 PROCEDURE — 3017F COLORECTAL CA SCREEN DOC REV: CPT | Performed by: INTERNAL MEDICINE

## 2020-03-12 PROCEDURE — 1036F TOBACCO NON-USER: CPT | Performed by: INTERNAL MEDICINE

## 2020-03-12 PROCEDURE — 1123F ACP DISCUSS/DSCN MKR DOCD: CPT | Performed by: INTERNAL MEDICINE

## 2020-03-12 PROCEDURE — G8427 DOCREV CUR MEDS BY ELIG CLIN: HCPCS | Performed by: INTERNAL MEDICINE

## 2020-03-12 NOTE — PROGRESS NOTES
Chief Complaint   Patient presents with    Atrial Fibrillation    Hypertension    Hyperlipidemia       4-26-18: Patient presents for initial medical evaluation. Patient is followed on a regular basis by Dr. Purnima Rodriguez MD. Has been dealing with URI/bronchitis for the past week or so. Feeling tired, SOB. EKG today shows afibb at 111bpm. Does have orthopnea as well. No previous hx of afibb. No hx of MI, CHF or arrhythmias. No hx of LHC. Last stress test was 10 yrs ago. Pt denies chest pain,   nausea, vomiting, diarrhea, constipation, motor weakness, insomnia, weight loss, syncope, dizziness, lightheadedness, palpitations, PND, orthopnea, or claudication. + cough and nasal congestion. 5-3-18: feeling better overall, still SOB with mild exertion but much improved from last office visit. . s/p CXR with cardiomegaly and right pleural effusion. s/p ECHO with EF of 45%, mod LAE, Severe AS with Vmax of 4.3m/s, Peak of 74mmHg, mean of 38mmHg, AMARILYS 0.84cm2, RVSP of 35mmHg. BNP was 7000, lipid profile is ok, trop was negative. Renal function is normal. Diuresed well with lasix. HR and BP are controlled now. EKG with Afibb at 85bpm today. Pt denies chest pain,   nausea, vomiting, diarrhea, constipation, motor weakness, insomnia, weight loss, syncope, dizziness, lightheadedness, palpitations, PND, orthopnea. No LE discoloration or ulcers. + b/l  LE edema. Lipid profile is normal. No recent hospitalization. 5-15-18: s/p LHC with mild CAD, valve not able to be crossed but did not try hard to cross. s/p EMMANUEL with severe AS, EF of 35-40%, mild MR, mild AI, mild PHTN. s/p unsuccessful CV . EKG today with afibb at 84bpm. Pt denies chest pain, dyspnea, dyspnea on exertion, change in exercise capacity,  nausea, vomiting, diarrhea, constipation, motor weakness, insomnia, weight loss, syncope, dizziness, lightheadedness, palpitations, PND, orthopnea, or claudication. He feels very tired and sleeps all the time. he has an Pt denies chest pain, dyspnea, dyspnea on exertion, change in exercise capacity, fatigue,  nausea, vomiting, diarrhea, constipation, motor weakness, insomnia, weight loss, syncope, dizziness, lightheadedness, palpitations, PND, orthopnea, or claudication. No nitro use. BP and hr are good. CAD is stable. No LE discoloration or ulcers. No LE edema. No CHF type symptoms. Lipid profile is normal. No recent hospitalization. No change in meds. On NOAC, no bleeding issues. 19: s/p ILR with 13 pause in  and 18 sec pause in  during the day. States he has been sleeping more than usual. Remains on NOAC, no bleeding issues. Pt denies chest pain, dyspnea, dyspnea on exertion, change in exercise capacity, fatigue,  nausea, vomiting, diarrhea, constipation, motor weakness, insomnia, weight loss, syncope, dizziness, lightheadedness, palpitations, PND, orthopnea, or claudication. No nitro use. BP and hr are good. CAD is stable. No LE discoloration or ulcers. No LE edema. No CHF type symptoms. Lipid profile is normal.    3-12-19: s/p PPM dual chamber and ILR removal. S/p ECHO with normal LVF. Needs to have dental work and amoxicillin script. Pt denies chest pain, dyspnea, dyspnea on exertion, change in exercise capacity, fatigue,  nausea, vomiting, diarrhea, constipation, motor weakness, insomnia, weight loss, syncope, dizziness, lightheadedness, palpitations, PND, orthopnea, or claudication. On DOAC. No bleeding issues. Pacer healing well. EKG with NSR.     19: his wife  2 weeks ago. He is having a hard time sleeping. Hx of Pafib, on DOAC, no bleeding issues. Pt denies chest pain, dyspnea, dyspnea on exertion, change in exercise capacity, fatigue,  nausea, vomiting, diarrhea, constipation, motor weakness, insomnia, weight loss, syncope, dizziness, lightheadedness, palpitations, PND, orthopnea, or claudication. No nitro use. BP and hr are good. CAD is stable. No LE discoloration or ulcers. No LE edema. No CHF type symptoms. Lipid profile is normal. No recent hospitalization. No change in meds. 19: Pt denies chest pain, dyspnea, dyspnea on exertion, change in exercise capacity, fatigue,  nausea, vomiting, diarrhea, constipation, motor weakness, insomnia, weight loss, syncope, dizziness, lightheadedness, palpitations, PND, orthopnea, or claudication. No nitro use. BP and hr are good. CAD is stable. No LE discoloration or ulcers. No LE edema. No CHF type symptoms. Lipid profile is normal. No recent hospitalization. No change in meds. Has lost 40 pounds since his wife . EKG with ? afib or NSR. S/p PPM check in 19 with 0% afib burden. 3-12-20: Pt denies chest pain, dyspnea, dyspnea on exertion, change in exercise capacity, fatigue,  nausea, vomiting, diarrhea, constipation, motor weakness, insomnia, weight loss, syncope, dizziness, lightheadedness, palpitations, PND, orthopnea, or claudication. No nitro use. BP and hr are good. CAD is stable. No LE discoloration or ulcers. No LE edema. No CHF type symptoms. Lipid profile is normal. No recent hospitalization. No change in meds. Hx of pafib. On DOAC. Hx of PPM.   S/p ECHO with  in 2019, patient states it was ok. Patient Active Problem List   Diagnosis    Colon polyps    Seasonal allergies    Essential hypertension    Morbid obesity due to excess calories ([address]    Irritable bowel syndrome without diarrhea    Anemia    Mixed hyperlipidemia    Persistent atrial fibrillation    Cardiac murmur    Primary osteoarthritis involving multiple joints    S/P TAVR (transcatheter aortic valve replacement)    History of aortic stenosis    Atrial fibrillation and flutter (HCC)    Nonrheumatic aortic (valve) stenosis    Pacemaker- dual chamber MRI compatible. Past Surgical History:   Procedure Laterality Date    AORTIC VALVE SURGERY  [id number]    DR. NG    CHOLECYSTECTOMY  2010    COLONOSCOPY      INSERTABLE CARDIAC MONITOR  09/27/2018    DR. Singh  2018    KNEE SURGERY  1992    PACEMAKER INSERTION  02/19/2019    DR. PARKER       Social History     Socioeconomic History    Marital status:      Spouse name: Not on file    Number of children: Not on file    Years of education: Not on file    Highest education level: Not on file   Occupational History    Not on file   Social Needs    Financial resource strain: Not on file    Food insecurity     Worry: Not on file     Inability: Not on file    Transportation needs     Medical: Not on file     Non-medical: Not on file   Tobacco Use    Smoking status: Never Smoker    Smokeless tobacco: Never Used   Substance and Sexual Activity    Alcohol use: No    Drug use: No    Sexual activity: Not on file   Lifestyle    Physical activity     Days per week: Not on file     Minutes per session: Not on file    Stress: Not on file   Relationships    Social connections     Talks on phone: Not on file     Gets together: Not on file     Attends Jewish service: Not on file     Active member of club or organization: Not on file     Attends meetings of clubs or organizations: Not on file     Relationship status: Not on file    Intimate partner violence     Fear of current or ex partner: Not on file     Emotionally abused: Not on file     Physically abused: Not on file     Forced sexual activity: Not on file   Other Topics Concern    Not on file   Social History Narrative    Not on file       Family History   Problem Relation Age of Onset    Other Mother         BRAIN TUMOR       Current Outpatient Medications   Medication Sig Dispense Refill    furosemide (LASIX) 40 MG tablet TAKE 1 TABLET DAILY 90 tablet 3    apixaban (ELIQUIS) 5 MG TABS tablet Take 1 tablet by mouth 2 times daily 180 tablet 3    CARTIA  MG extended release capsule TAKE 1 CAPSULE DAILY 90 capsule 3    metoprolol tartrate (LOPRESSOR) 50 MG tablet Take 1 tablet by enzymes, BUN, CR, and electrolytes. Details of medical condition explained and patient was warned about adverse consequences of uncontrolled medical conditions and possible side effects of prescribed medications.

## 2020-09-21 RX ORDER — APIXABAN 5 MG/1
TABLET, FILM COATED ORAL
Qty: 180 TABLET | Refills: 3 | Status: SHIPPED | OUTPATIENT
Start: 2020-09-21 | End: 2021-08-13

## 2020-09-21 RX ORDER — DILTIAZEM HYDROCHLORIDE 180 MG/1
CAPSULE, COATED, EXTENDED RELEASE ORAL
Qty: 90 CAPSULE | Refills: 3 | Status: SHIPPED | OUTPATIENT
Start: 2020-09-21 | End: 2021-08-13

## 2020-09-21 NOTE — TELEPHONE ENCOUNTER
requesting medication refill.  Please approve or deny this request.    Rx requested:  Requested Prescriptions     Pending Prescriptions Disp Refills    ELIQUIS 5 MG TABS tablet [Pharmacy Med Name: Flory Batters 5MG] 180 tablet 3     Sig: TAKE 1 TABLET TWICE A DAY    dilTIAZem (CARDIZEM CD) 180 MG extended release capsule [Pharmacy Med Name: DILTIAZEM HCL ER(CD) CAPS 180MG] 90 capsule 3     Sig: TAKE 1 CAPSULE DAILY         Last Office Visit:   3/12/2020      Next Visit Date:  Future Appointments   Date Time Provider Fernando Hernandez   9/24/2020  8:00 AM Ned Graf, DO Michelle Luevano

## 2020-10-27 ENCOUNTER — OFFICE VISIT (OUTPATIENT)
Dept: CARDIOLOGY CLINIC | Age: 70
End: 2020-10-27
Payer: MEDICARE

## 2020-10-27 VITALS
OXYGEN SATURATION: 98 % | WEIGHT: 315 LBS | DIASTOLIC BLOOD PRESSURE: 78 MMHG | SYSTOLIC BLOOD PRESSURE: 136 MMHG | TEMPERATURE: 96.5 F | RESPIRATION RATE: 18 BRPM | HEART RATE: 88 BPM | BODY MASS INDEX: 59.3 KG/M2

## 2020-10-27 PROBLEM — I48.19 PERSISTENT ATRIAL FIBRILLATION (HCC): Status: RESOLVED | Noted: 2018-04-26 | Resolved: 2020-10-27

## 2020-10-27 PROCEDURE — 4040F PNEUMOC VAC/ADMIN/RCVD: CPT | Performed by: INTERNAL MEDICINE

## 2020-10-27 PROCEDURE — G8417 CALC BMI ABV UP PARAM F/U: HCPCS | Performed by: INTERNAL MEDICINE

## 2020-10-27 PROCEDURE — 1036F TOBACCO NON-USER: CPT | Performed by: INTERNAL MEDICINE

## 2020-10-27 PROCEDURE — 93000 ELECTROCARDIOGRAM COMPLETE: CPT | Performed by: INTERNAL MEDICINE

## 2020-10-27 PROCEDURE — G8482 FLU IMMUNIZE ORDER/ADMIN: HCPCS | Performed by: INTERNAL MEDICINE

## 2020-10-27 PROCEDURE — 1123F ACP DISCUSS/DSCN MKR DOCD: CPT | Performed by: INTERNAL MEDICINE

## 2020-10-27 PROCEDURE — G8427 DOCREV CUR MEDS BY ELIG CLIN: HCPCS | Performed by: INTERNAL MEDICINE

## 2020-10-27 PROCEDURE — 3017F COLORECTAL CA SCREEN DOC REV: CPT | Performed by: INTERNAL MEDICINE

## 2020-10-27 PROCEDURE — 99214 OFFICE O/P EST MOD 30 MIN: CPT | Performed by: INTERNAL MEDICINE

## 2020-10-27 NOTE — PROGRESS NOTES
Chief Complaint   Patient presents with    6 Month Follow-Up    Atrial Fibrillation    Hypertension       4-26-18: Patient presents for initial medical evaluation. Patient is followed on a regular basis by Dr. Josemanuel Gregg MD. Has been dealing with URI/bronchitis for the past week or so. Feeling tired, SOB. EKG today shows afibb at 111bpm. Does have orthopnea as well. No previous hx of afibb. No hx of MI, CHF or arrhythmias. No hx of LHC. Last stress test was 10 yrs ago. Pt denies chest pain,   nausea, vomiting, diarrhea, constipation, motor weakness, insomnia, weight loss, syncope, dizziness, lightheadedness, palpitations, PND, orthopnea, or claudication. + cough and nasal congestion. 5-3-18: feeling better overall, still SOB with mild exertion but much improved from last office visit. . s/p CXR with cardiomegaly and right pleural effusion. s/p ECHO with EF of 45%, mod LAE, Severe AS with Vmax of 4.3m/s, Peak of 74mmHg, mean of 38mmHg, AMARILYS 0.84cm2, RVSP of 35mmHg. BNP was 7000, lipid profile is ok, trop was negative. Renal function is normal. Diuresed well with lasix. HR and BP are controlled now. EKG with Afibb at 85bpm today. Pt denies chest pain,   nausea, vomiting, diarrhea, constipation, motor weakness, insomnia, weight loss, syncope, dizziness, lightheadedness, palpitations, PND, orthopnea. No LE discoloration or ulcers. + b/l  LE edema. Lipid profile is normal. No recent hospitalization. 5-15-18: s/p LHC with mild CAD, valve not able to be crossed but did not try hard to cross. s/p EMMANUEL with severe AS, EF of 35-40%, mild MR, mild AI, mild PHTN. s/p unsuccessful CV . EKG today with afibb at 84bpm. Pt denies chest pain, dyspnea, dyspnea on exertion, change in exercise capacity,  nausea, vomiting, diarrhea, constipation, motor weakness, insomnia, weight loss, syncope, dizziness, lightheadedness, palpitations, PND, orthopnea, or claudication. He feels very tired and sleeps all the time. he has an appointment with  for AV TAVR or surgery. On Eliquis and no bleeding issues. On amiodarone as well.     7-10-18: S/P TAVR AT  WITH KIRAN. WAS PLACED ON ASA/PLAVIX FOR ONE MONTH AND HE IS TO RESUME ELIQUIS AFTER. COREG WAS STOPPED AND PLACED ON CARDIZEM. HE IS NOT ON AMIODARONE. Pt denies chest pain, dyspnea, dyspnea on exertion, change in exercise capacity, fatigue,  nausea, vomiting, diarrhea, constipation, motor weakness, insomnia, weight loss, syncope, dizziness, lightheadedness, palpitations, PND, orthopnea, or claudication. NO ISSUES WITH THE PROCEDURE OTHER THAN UTI. No nitro use. BP and hr are good. CAD is stable. No LE discoloration or ulcers. No LE edema. No CHF type symptoms. Lipid profile is normal. ekg TODAY WITH AFIBB AT 90BPM.       8-23-18: HAS BEEN ON NOAC FOR AT LEAST 4 WEEKS. EKG today shows afibb with CVR. Pt denies chest pain, dyspnea, dyspnea on exertion, change in exercise capacity, fatigue,  nausea, vomiting, diarrhea, constipation, motor weakness, insomnia, weight loss, syncope, dizziness, lightheadedness, palpitations, PND, orthopnea, or claudication. No nitro use. BP and hr are good. CAD is stable. No LE discoloration or ulcers. No LE edema. No CHF type symptoms. Lipid profile is normal. No recent hospitalization. No change in meds. Uses a walker. Not able to lose weight. Does not want to have R[facility] testing. 9-13-18: s/p hosp for Tikosin loading but did not tolerate, QTC was too long and was having 3-4 sec pauses. Placed on CCB and BBLocker. On NOAC, no bleeding issues. S/p CV into NSR. Was on amiodarone previously. EKG with NSR, LBBB. He does not feel really when he's in afibb. Feeling ok now. Pt denies chest pain, dyspnea, dyspnea on exertion, change in exercise capacity, fatigue,  nausea, vomiting, diarrhea, constipation, motor weakness, insomnia, weight loss, syncope, dizziness, lightheadedness, palpitations, PND, orthopnea, or claudication. 11-20-18: s/p ILR. Healing well. Pt denies chest pain, dyspnea, dyspnea on exertion, change in exercise capacity, fatigue,  nausea, vomiting, diarrhea, constipation, motor weakness, insomnia, weight loss, syncope, dizziness, lightheadedness, palpitations, PND, orthopnea, or claudication. No nitro use. BP and hr are good. CAD is stable. No LE discoloration or ulcers. No LE edema. No CHF type symptoms. Lipid profile is normal. No recent hospitalization. No change in meds. On NOAC, no bleeding issues. 19: s/p ILR with 13 pause in  and 18 sec pause in  during the day. States he has been sleeping more than usual. Remains on NOAC, no bleeding issues. Pt denies chest pain, dyspnea, dyspnea on exertion, change in exercise capacity, fatigue,  nausea, vomiting, diarrhea, constipation, motor weakness, insomnia, weight loss, syncope, dizziness, lightheadedness, palpitations, PND, orthopnea, or claudication. No nitro use. BP and hr are good. CAD is stable. No LE discoloration or ulcers. No LE edema. No CHF type symptoms. Lipid profile is normal.    3-12-19: s/p PPM dual chamber and ILR removal. S/p ECHO with normal LVF. Needs to have dental work and amoxicillin script. Pt denies chest pain, dyspnea, dyspnea on exertion, change in exercise capacity, fatigue,  nausea, vomiting, diarrhea, constipation, motor weakness, insomnia, weight loss, syncope, dizziness, lightheadedness, palpitations, PND, orthopnea, or claudication. On DOAC. No bleeding issues. Pacer healing well. EKG with NSR.     19: his wife  2 weeks ago. He is having a hard time sleeping. Hx of Pafib, on DOAC, no bleeding issues. Pt denies chest pain, dyspnea, dyspnea on exertion, change in exercise capacity, fatigue,  nausea, vomiting, diarrhea, constipation, motor weakness, insomnia, weight loss, syncope, dizziness, lightheadedness, palpitations, PND, orthopnea, or claudication. No nitro use. BP and hr are good. CAD is stable. No LE discoloration or ulcers. No LE edema. No CHF type symptoms. Lipid profile is normal. No recent hospitalization. No change in meds. 19: Pt denies chest pain, dyspnea, dyspnea on exertion, change in exercise capacity, fatigue,  nausea, vomiting, diarrhea, constipation, motor weakness, insomnia, weight loss, syncope, dizziness, lightheadedness, palpitations, PND, orthopnea, or claudication. No nitro use. BP and hr are good. CAD is stable. No LE discoloration or ulcers. No LE edema. No CHF type symptoms. Lipid profile is normal. No recent hospitalization. No change in meds. Has lost 40 pounds since his wife . EKG with ? afib or NSR. S/p PPM check in 19 with 0% afib burden. 3-12-20: Pt denies chest pain, dyspnea, dyspnea on exertion, change in exercise capacity, fatigue,  nausea, vomiting, diarrhea, constipation, motor weakness, insomnia, weight loss, syncope, dizziness, lightheadedness, palpitations, PND, orthopnea, or claudication. No nitro use. BP and hr are good. CAD is stable. No LE discoloration or ulcers. No LE edema. No CHF type symptoms. Lipid profile is normal. No recent hospitalization. No change in meds. Hx of pafib. On DOAC. Hx of PPM.   S/p ECHO with  in 2019, patient states it was ok. 10-27-20: S/P RECENT PPM check and was ok. Hx of his aortic valve replacement in 2018. History of cardiac catheterization prior to the showed mild coronary artery disease. Patient with history of paroxysmal atrial fibrillation on oral anticoagulation. History of permanent pacemaker placement. States he is feeling well overall. He denies any recent hospitalizations. No issues with his medications and he is compliant with his medication. BP and HR are ok. Sedentary lifestyle. EKG with NSR today.      Patient Active Problem List   Diagnosis    Colon polyps    Seasonal allergies    Essential hypertension    Morbid obesity due to excess calories ([address]    Irritable bowel syndrome without diarrhea    Anemia    Mixed hyperlipidemia    Cardiac murmur    Primary osteoarthritis involving multiple joints    S/P TAVR (transcatheter aortic valve replacement)    History of aortic stenosis    Atrial fibrillation and flutter (HCC)    Nonrheumatic aortic (valve) stenosis    Pacemaker- dual chamber MRI compatible. Past Surgical History:   Procedure Laterality Date    AORTIC VALVE SURGERY  [id number]    DR. NG    CHOLECYSTECTOMY  03/2010    COLONOSCOPY  2009    INSERTABLE CARDIAC MONITOR  09/27/2018    DR. Singh  2018    KNEE SURGERY  1992    PACEMAKER INSERTION  02/19/2019    DR. PARKER       Social History     Socioeconomic History    Marital status:      Spouse name: None    Number of children: None    Years of education: None    Highest education level: None   Occupational History    None   Social Needs    Financial resource strain: None    Food insecurity     Worry: None     Inability: None    Transportation needs     Medical: None     Non-medical: None   Tobacco Use    Smoking status: Never Smoker    Smokeless tobacco: Never Used   Substance and Sexual Activity    Alcohol use: No    Drug use: No    Sexual activity: None   Lifestyle    Physical activity     Days per week: None     Minutes per session: None    Stress: None   Relationships    Social connections     Talks on phone: None     Gets together: None     Attends Mandaen service: None     Active member of club or organization: None     Attends meetings of clubs or organizations: None     Relationship status: None    Intimate partner violence     Fear of current or ex partner: None     Emotionally abused: None     Physically abused: None     Forced sexual activity: None   Other Topics Concern    None   Social History Narrative    None       Family History   Problem Relation Age of Onset    Other Mother         BRAIN TUMOR       Current Outpatient Medications   Medication Sig Dispense Refill %.  Body mass index is 59.3 kg/m². Physical Examination:  General appearance - alert, well appearing, and in no distress and overweight  Mental status - alert, oriented to person, place, and time  Neck - Neck is supple, no JVD or carotid bruits. No thyromegaly or adenopathy. Chest - clear to auscultation, no wheezes, rales or rhonchi, symmetric air entry  Heart - irregularly irregular rhythm with rate 100. + murmur, 3/6 systolic right sb. Abdomen - soft, nontender, nondistended, no masses or organomegaly  Neurological - alert, oriented, normal speech, no focal findings or movement disorder noted  Extremities - peripheral pulses normal, no pedal edema, no clubbing or cyanosis  Skin - normal coloration and turgor, no rashes, no suspicious skin lesions noted  Pacer site looks good. EKG: NSR. Orders Placed This Encounter   Procedures    EKG 12 lead       ASSESSMENT:     Diagnosis Orders   1. Essential hypertension     2. Persistent atrial fibrillation (HCC)  EKG 12 lead   3. Atrial fibrillation and flutter ([address]     4. Mixed hyperlipidemia     5. History of aortic stenosis     6. Pacemaker- dual chamber MRI compatible. 7. S/P TAVR (transcatheter aortic valve replacement)     8. Morbid obesity due to excess calories [facility])           PLAN:     Patient will need to continue to follow up with you for their general medical care     As always, aggressive risk factor modification is strongly recommended. We should adhere to the JNC VIII guidelines for HTN management and the NCEP ATP III guidelines for LDL-C management. Cardiac diet is always recommended with low fat, cholesterol, calories and sodium. Continue medications at current doses. Does not want to have [facility] testing. Consider amiodarone or sotalol in future post PPM. Will await next PPM check to see if he has afib burden. Follow up with pacer clinic. Check EKG    Follow up with TAVR team once a year.      Multiple signs and symptoms suggestive of sleep apnea, will consider sleep study after cardiac evaluation is completed    Patient was advised and encouraged to check blood pressure at home or at a pharmacy, maintain a logbook, and also call us back if blood pressure are above the target ranges or if it is low. Patient clearly understands and agrees to the instructions. We will need to continue to monitor muscle and liver enzymes, BUN, CR, and electrolytes. Details of medical condition explained and patient was warned about adverse consequences of uncontrolled medical conditions and possible side effects of prescribed medications.

## 2021-01-11 RX ORDER — FUROSEMIDE 40 MG/1
TABLET ORAL
Qty: 90 TABLET | Refills: 3 | Status: SHIPPED | OUTPATIENT
Start: 2021-01-11 | End: 2022-03-14

## 2021-03-31 DIAGNOSIS — I48.92 ATRIAL FIBRILLATION AND FLUTTER (HCC): ICD-10-CM

## 2021-03-31 DIAGNOSIS — I48.91 ATRIAL FIBRILLATION AND FLUTTER (HCC): ICD-10-CM

## 2021-03-31 DIAGNOSIS — I10 ESSENTIAL HYPERTENSION: Primary | ICD-10-CM

## 2021-03-31 RX ORDER — METOPROLOL TARTRATE 50 MG/1
TABLET, FILM COATED ORAL
Qty: 180 TABLET | Refills: 3 | Status: SHIPPED | OUTPATIENT
Start: 2021-03-31 | End: 2022-03-07

## 2021-03-31 NOTE — TELEPHONE ENCOUNTER
requesting medication refill.  Please approve or deny this request.    Rx requested:  Requested Prescriptions     Pending Prescriptions Disp Refills    metoprolol tartrate (LOPRESSOR) 50 MG tablet [Pharmacy Med Name: METOPROLOL TARTRATE TABS 50MG] 180 tablet 3     Sig: TAKE 1 TABLET TWICE A DAY         Last Office Visit:   10/27/2020      Next Visit Date:  Future Appointments   Date Time Provider Fernando Hernandez   5/4/2021  8:30 AM [address], DO Michelle Luevano

## 2021-05-04 ENCOUNTER — OFFICE VISIT (OUTPATIENT)
Dept: CARDIOLOGY CLINIC | Age: 71
End: 2021-05-04
Payer: MEDICARE

## 2021-05-04 VITALS
WEIGHT: 315 LBS | HEART RATE: 87 BPM | DIASTOLIC BLOOD PRESSURE: 76 MMHG | SYSTOLIC BLOOD PRESSURE: 120 MMHG | BODY MASS INDEX: 56.11 KG/M2

## 2021-05-04 DIAGNOSIS — Z95.0 PACEMAKER: ICD-10-CM

## 2021-05-04 DIAGNOSIS — Z95.2 S/P TAVR (TRANSCATHETER AORTIC VALVE REPLACEMENT): ICD-10-CM

## 2021-05-04 DIAGNOSIS — I10 ESSENTIAL HYPERTENSION: ICD-10-CM

## 2021-05-04 DIAGNOSIS — I48.91 ATRIAL FIBRILLATION AND FLUTTER (HCC): ICD-10-CM

## 2021-05-04 DIAGNOSIS — I48.19 PERSISTENT ATRIAL FIBRILLATION (HCC): Primary | ICD-10-CM

## 2021-05-04 DIAGNOSIS — E78.2 MIXED HYPERLIPIDEMIA: ICD-10-CM

## 2021-05-04 DIAGNOSIS — I48.92 ATRIAL FIBRILLATION AND FLUTTER (HCC): ICD-10-CM

## 2021-05-04 DIAGNOSIS — E66.01 MORBID OBESITY DUE TO EXCESS CALORIES (HCC): ICD-10-CM

## 2021-05-04 DIAGNOSIS — Z86.79 HISTORY OF AORTIC STENOSIS: ICD-10-CM

## 2021-05-04 PROCEDURE — 3017F COLORECTAL CA SCREEN DOC REV: CPT | Performed by: INTERNAL MEDICINE

## 2021-05-04 PROCEDURE — 1123F ACP DISCUSS/DSCN MKR DOCD: CPT | Performed by: INTERNAL MEDICINE

## 2021-05-04 PROCEDURE — 1036F TOBACCO NON-USER: CPT | Performed by: INTERNAL MEDICINE

## 2021-05-04 PROCEDURE — G8417 CALC BMI ABV UP PARAM F/U: HCPCS | Performed by: INTERNAL MEDICINE

## 2021-05-04 PROCEDURE — 4040F PNEUMOC VAC/ADMIN/RCVD: CPT | Performed by: INTERNAL MEDICINE

## 2021-05-04 PROCEDURE — G8427 DOCREV CUR MEDS BY ELIG CLIN: HCPCS | Performed by: INTERNAL MEDICINE

## 2021-05-04 PROCEDURE — 99214 OFFICE O/P EST MOD 30 MIN: CPT | Performed by: INTERNAL MEDICINE

## 2021-05-04 PROCEDURE — 93000 ELECTROCARDIOGRAM COMPLETE: CPT | Performed by: INTERNAL MEDICINE

## 2021-05-04 NOTE — PROGRESS NOTES
Chief Complaint   Patient presents with    Atrial Fibrillation    Hypertension       4-26-18: Patient presents for initial medical evaluation. Patient is followed on a regular basis by Dr. Swapnil Anderson MD. Has been dealing with URI/bronchitis for the past week or so. Feeling tired, SOB. EKG today shows afibb at 111bpm. Does have orthopnea as well. No previous hx of afibb. No hx of MI, CHF or arrhythmias. No hx of LHC. Last stress test was 10 yrs ago. Pt denies chest pain,   nausea, vomiting, diarrhea, constipation, motor weakness, insomnia, weight loss, syncope, dizziness, lightheadedness, palpitations, PND, orthopnea, or claudication. + cough and nasal congestion. 5-3-18: feeling better overall, still SOB with mild exertion but much improved from last office visit. . s/p CXR with cardiomegaly and right pleural effusion. s/p ECHO with EF of 45%, mod LAE, Severe AS with Vmax of 4.3m/s, Peak of 74mmHg, mean of 38mmHg, AMARILYS 0.84cm2, RVSP of 35mmHg. BNP was 7000, lipid profile is ok, trop was negative. Renal function is normal. Diuresed well with lasix. HR and BP are controlled now. EKG with Afibb at 85bpm today. Pt denies chest pain,   nausea, vomiting, diarrhea, constipation, motor weakness, insomnia, weight loss, syncope, dizziness, lightheadedness, palpitations, PND, orthopnea. No LE discoloration or ulcers. + b/l  LE edema. Lipid profile is normal. No recent hospitalization. 5-15-18: s/p LHC with mild CAD, valve not able to be crossed but did not try hard to cross. s/p EMMANUEL with severe AS, EF of 35-40%, mild MR, mild AI, mild PHTN. s/p unsuccessful CV . EKG today with afibb at 84bpm. Pt denies chest pain, dyspnea, dyspnea on exertion, change in exercise capacity,  nausea, vomiting, diarrhea, constipation, motor weakness, insomnia, weight loss, syncope, dizziness, lightheadedness, palpitations, PND, orthopnea, or claudication. He feels very tired and sleeps all the time. he has an appointment with  for AV TAVR or surgery. On Eliquis and no bleeding issues. On amiodarone as well.     7-10-18: S/P TAVR AT  WITH [facility]. WAS PLACED ON ASA/PLAVIX FOR ONE MONTH AND HE IS TO RESUME ELIQUIS AFTER. COREG WAS STOPPED AND PLACED ON CARDIZEM. HE IS NOT ON AMIODARONE. Pt denies chest pain, dyspnea, dyspnea on exertion, change in exercise capacity, fatigue,  nausea, vomiting, diarrhea, constipation, motor weakness, insomnia, weight loss, syncope, dizziness, lightheadedness, palpitations, PND, orthopnea, or claudication. NO ISSUES WITH THE PROCEDURE OTHER THAN UTI. No nitro use. BP and hr are good. CAD is stable. No LE discoloration or ulcers. No LE edema. No CHF type symptoms. Lipid profile is normal. ekg TODAY WITH AFIBB AT 90BPM.       8-23-18: HAS BEEN ON NOAC FOR AT LEAST 4 WEEKS. EKG today shows afibb with CVR. Pt denies chest pain, dyspnea, dyspnea on exertion, change in exercise capacity, fatigue,  nausea, vomiting, diarrhea, constipation, motor weakness, insomnia, weight loss, syncope, dizziness, lightheadedness, palpitations, PND, orthopnea, or claudication. No nitro use. BP and hr are good. CAD is stable. No LE discoloration or ulcers. No LE edema. No CHF type symptoms. Lipid profile is normal. No recent hospitalization. No change in meds. Uses a walker. Not able to lose weight. Does not want to have [facility] testing. 9-13-18: s/p hosp for Tikosin loading but did not tolerate, QTC was too long and was having 3-4 sec pauses. Placed on CCB and BBLocker. On NOAC, no bleeding issues. S/p CV into NSR. Was on amiodarone previously. EKG with NSR, LBBB. He does not feel really when he's in afibb. Feeling ok now. Pt denies chest pain, dyspnea, dyspnea on exertion, change in exercise capacity, fatigue,  nausea, vomiting, diarrhea, constipation, motor weakness, insomnia, weight loss, syncope, dizziness, lightheadedness, palpitations, PND, orthopnea, or claudication. 11-20-18: s/p ILR. Healing well.  Pt denies chest pain, dyspnea, dyspnea on exertion, change in exercise capacity, fatigue,  nausea, vomiting, diarrhea, constipation, motor weakness, insomnia, weight loss, syncope, dizziness, lightheadedness, palpitations, PND, orthopnea, or claudication. No nitro use. BP and hr are good. CAD is stable. No LE discoloration or ulcers. No LE edema. No CHF type symptoms. Lipid profile is normal. No recent hospitalization. No change in meds. On NOAC, no bleeding issues. 19: s/p ILR with 13 pause in  and 18 sec pause in  during the day. States he has been sleeping more than usual. Remains on NOAC, no bleeding issues. Pt denies chest pain, dyspnea, dyspnea on exertion, change in exercise capacity, fatigue,  nausea, vomiting, diarrhea, constipation, motor weakness, insomnia, weight loss, syncope, dizziness, lightheadedness, palpitations, PND, orthopnea, or claudication. No nitro use. BP and hr are good. CAD is stable. No LE discoloration or ulcers. No LE edema. No CHF type symptoms. Lipid profile is normal.    3-12-19: s/p PPM dual chamber and ILR removal. S/p ECHO with normal LVF. Needs to have dental work and amoxicillin script. Pt denies chest pain, dyspnea, dyspnea on exertion, change in exercise capacity, fatigue,  nausea, vomiting, diarrhea, constipation, motor weakness, insomnia, weight loss, syncope, dizziness, lightheadedness, palpitations, PND, orthopnea, or claudication. On DOAC. No bleeding issues. Pacer healing well. EKG with NSR.     19: his wife  2 weeks ago. He is having a hard time sleeping. Hx of Pafib, on DOAC, no bleeding issues. Pt denies chest pain, dyspnea, dyspnea on exertion, change in exercise capacity, fatigue,  nausea, vomiting, diarrhea, constipation, motor weakness, insomnia, weight loss, syncope, dizziness, lightheadedness, palpitations, PND, orthopnea, or claudication. No nitro use. BP and hr are good. CAD is stable. No LE discoloration or ulcers. No LE edema.  No CHF type symptoms. Lipid profile is normal. No recent hospitalization. No change in meds. 19: Pt denies chest pain, dyspnea, dyspnea on exertion, change in exercise capacity, fatigue,  nausea, vomiting, diarrhea, constipation, motor weakness, insomnia, weight loss, syncope, dizziness, lightheadedness, palpitations, PND, orthopnea, or claudication. No nitro use. BP and hr are good. CAD is stable. No LE discoloration or ulcers. No LE edema. No CHF type symptoms. Lipid profile is normal. No recent hospitalization. No change in meds. Has lost 40 pounds since his wife . EKG with ? afib or NSR. S/p PPM check in 19 with 0% afib burden. 3-12-20: Pt denies chest pain, dyspnea, dyspnea on exertion, change in exercise capacity, fatigue,  nausea, vomiting, diarrhea, constipation, motor weakness, insomnia, weight loss, syncope, dizziness, lightheadedness, palpitations, PND, orthopnea, or claudication. No nitro use. BP and hr are good. CAD is stable. No LE discoloration or ulcers. No LE edema. No CHF type symptoms. Lipid profile is normal. No recent hospitalization. No change in meds. Hx of pafib. On DOAC. Hx of PPM.   S/p ECHO with  in 2019, patient states it was ok. 10-27-20: S/P RECENT PPM check and was ok. Hx of his aortic valve replacement in 2018. History of cardiac catheterization prior to the showed mild coronary artery disease. Patient with history of paroxysmal atrial fibrillation on oral anticoagulation. History of permanent pacemaker placement. States he is feeling well overall. He denies any recent hospitalizations. No issues with his medications and he is compliant with his medication. BP and HR are ok. Sedentary lifestyle. EKG with NSR today. May 4, 2021: Patient states he is doing well overall. Patient with history of paroxysmal atrial fibrillation he is on oral anticoagulation and history of permanent pacemaker placement. He denies any bleeding issues.   Patient with history of cardiac catheterization with mild CAD. He denies any recent hospitalizations. No symptoms of CHF or angina. Dealing with a left rotator cuff injury. Patient with history of severe aortic stenosis status post TAVR at [facility]. EKG today with normal sinus rhythm, nonspecific interventricular conduction delay/incomplete left bundle branch block      Patient Active Problem List   Diagnosis    Colon polyps    Seasonal allergies    Essential hypertension    Morbid obesity due to excess calories (HCC)    Irritable bowel syndrome without diarrhea    Anemia    Mixed hyperlipidemia    Cardiac murmur    Primary osteoarthritis involving multiple joints    S/P TAVR (transcatheter aortic valve replacement)    History of aortic stenosis    Atrial fibrillation and flutter (HCC)    Nonrheumatic aortic (valve) stenosis    Pacemaker- dual chamber MRI compatible. Past Surgical History:   Procedure Laterality Date    AORTIC VALVE SURGERY  [id number]    DR. NG    CHOLECYSTECTOMY  03/2010    COLONOSCOPY  2009    INSERTABLE CARDIAC MONITOR  09/27/2018    DR. Singh  2018    KNEE SURGERY  1992    PACEMAKER INSERTION  02/19/2019    DR. PARKER       Social History     Socioeconomic History    Marital status:      Spouse name: Not on file    Number of children: Not on file    Years of education: Not on file    Highest education level: Not on file   Occupational History    Not on file   Social Needs    Financial resource strain: Not on file    Food insecurity     Worry: Not on file     Inability: Not on file    Transportation needs     Medical: Not on file     Non-medical: Not on file   Tobacco Use    Smoking status: Never Smoker    Smokeless tobacco: Never Used   Substance and Sexual Activity    Alcohol use: No    Drug use: No    Sexual activity: Not on file   Lifestyle    Physical activity     Days per week: Not on file     Minutes per session: Not on file    Stress: Not on file   Relationships    Social connections     Talks on phone: Not on file     Gets together: Not on file     Attends Holiness service: Not on file     Active member of club or organization: Not on file     Attends meetings of clubs or organizations: Not on file     Relationship status: Not on file    Intimate partner violence     Fear of current or ex partner: Not on file     Emotionally abused: Not on file     Physically abused: Not on file     Forced sexual activity: Not on file   Other Topics Concern    Not on file   Social History Narrative    Not on file       Family History   Problem Relation Age of Onset    Other Mother         BRAIN TUMOR       Current Outpatient Medications   Medication Sig Dispense Refill    metoprolol tartrate (LOPRESSOR) 50 MG tablet TAKE 1 TABLET TWICE A  tablet 3    furosemide (LASIX) 40 MG tablet TAKE 1 TABLET DAILY 90 tablet 3    ELIQUIS 5 MG TABS tablet TAKE 1 TABLET TWICE A  tablet 3    dilTIAZem (CARDIZEM CD) 180 MG extended release capsule TAKE 1 CAPSULE DAILY 90 capsule 3    irbesartan (AVAPRO) 150 MG tablet TAKE 1 TABLET DAILY 30 tablet 0    cyclobenzaprine (FLEXERIL) 10 MG tablet TAKE 1 TABLET EVERY 8 HOURS AS NEEDED FOR MUSCLE SPASMS 180 tablet 1    irbesartan (AVAPRO) 150 MG tablet       Blood Pressure Monitor MISC 1 kit by Does not apply route 2 times daily 1 each 0    metroNIDAZOLE (METROGEL) 0.75 % gel       triamcinolone (KENALOG) 0.1 % cream Apply topically 2 times daily.  45 g 1    terazosin (HYTRIN) 10 MG capsule Take 1 capsule by mouth daily 90 capsule 3     Current Facility-Administered Medications   Medication Dose Route Frequency Provider Last Rate Last Admin    cyanocobalamin injection 1,000 mcg  1,000 mcg Intramuscular Once Kade Logan MD           Adhesive tape    Review of Systems:  General ROS: positive for  - fatigue  Psychological ROS: negative  Hematological and Lymphatic ROS: No history of blood clots or bleeding disorder. Respiratory ROS: positive for - cough and shortness of breath  Cardiovascular ROS: positive for - dyspnea on exertion, orthopnea and shortness of breath  Gastrointestinal ROS: no abdominal pain, change in bowel habits, or black or bloody stools  Genito-Urinary ROS: no dysuria, trouble voiding, or hematuria  Musculoskeletal ROS: negative  Neurological ROS: no TIA or stroke symptoms  Dermatological ROS: negative    VITALS:  Blood pressure 120/76, pulse 87, weight (!) 369 lb (167.4 kg). Body mass index is 56.11 kg/m². Physical Examination:  General appearance - alert, well appearing, and in no distress and overweight  Mental status - alert, oriented to person, place, and time  Neck - Neck is supple, no JVD or carotid bruits. No thyromegaly or adenopathy. Chest - clear to auscultation, no wheezes, rales or rhonchi, symmetric air entry  Heart - irregularly irregular rhythm with rate 100. + murmur, 3/6 systolic right sb. Abdomen - soft, nontender, nondistended, no masses or organomegaly  Neurological - alert, oriented, normal speech, no focal findings or movement disorder noted  Extremities - peripheral pulses normal, no pedal edema, no clubbing or cyanosis  Skin - normal coloration and turgor, no rashes, no suspicious skin lesions noted  Pacer site looks good. EKG: NSR. Orders Placed This Encounter   Procedures    EKG 12 Lead    ECHO Complete 2D W Doppler W Color       ASSESSMENT:     Diagnosis Orders   1. Persistent atrial fibrillation (HCC)  EKG 12 Lead   2. Essential hypertension     3. Morbid obesity due to excess calories ([address]     4. Mixed hyperlipidemia     5. S/P TAVR (transcatheter aortic valve replacement)  ECHO Complete 2D W Doppler W Color   6. History of aortic stenosis     7. Atrial fibrillation and flutter ([address]     8. Pacemaker- dual chamber MRI compatible.             PLAN:     Patient will need to continue to follow up with you for their general medical care     As always, aggressive risk factor modification is strongly recommended. We should adhere to the JNC VIII guidelines for HTN management and the NCEP ATP III guidelines for LDL-C management. Cardiac diet is always recommended with low fat, cholesterol, calories and sodium. Continue medications at current doses. Does not want to have [place] testing. Consider amiodarone or sotalol in future post PPM. Will await next PPM check to see if he has afib burden. Follow up with pacer clinic. Check EKG    Check ECHO given hx of TAVR. Multiple signs and symptoms suggestive of sleep apnea, will consider sleep study after cardiac evaluation is completed    Patient was advised and encouraged to check blood pressure at home or at a pharmacy, maintain a logbook, and also call us back if blood pressure are above the target ranges or if it is low. Patient clearly understands and agrees to the instructions. We will need to continue to monitor muscle and liver enzymes, BUN, CR, and electrolytes. Details of medical condition explained and patient was warned about adverse consequences of uncontrolled medical conditions and possible side effects of prescribed medications.

## 2021-05-13 ENCOUNTER — HOSPITAL ENCOUNTER (OUTPATIENT)
Dept: NON INVASIVE DIAGNOSTICS | Age: 71
Discharge: HOME | End: 2021-05-13
Payer: MEDICARE

## 2021-05-13 DIAGNOSIS — Z95.2 S/P TAVR (TRANSCATHETER AORTIC VALVE REPLACEMENT): ICD-10-CM

## 2021-05-13 LAB
LV EF: 60 %
LVEF MODALITY: NORMAL

## 2021-05-13 PROCEDURE — 93306 TTE W/DOPPLER COMPLETE: CPT

## 2021-08-13 DIAGNOSIS — I48.19 PERSISTENT ATRIAL FIBRILLATION (HCC): ICD-10-CM

## 2021-08-13 RX ORDER — APIXABAN 5 MG/1
TABLET, FILM COATED ORAL
Qty: 180 TABLET | Refills: 3 | Status: SHIPPED | OUTPATIENT
Start: 2021-08-13 | End: 2022-08-19

## 2021-08-13 RX ORDER — DILTIAZEM HYDROCHLORIDE 180 MG/1
CAPSULE, EXTENDED RELEASE ORAL
Qty: 90 CAPSULE | Refills: 3 | Status: SHIPPED | OUTPATIENT
Start: 2021-08-13 | End: 2022-07-14 | Stop reason: SDUPTHER

## 2021-11-23 ENCOUNTER — OFFICE VISIT (OUTPATIENT)
Dept: CARDIOLOGY CLINIC | Age: 71
End: 2021-11-23
Payer: MEDICARE

## 2021-11-23 VITALS
DIASTOLIC BLOOD PRESSURE: 80 MMHG | TEMPERATURE: 97.5 F | BODY MASS INDEX: 55.95 KG/M2 | HEART RATE: 65 BPM | SYSTOLIC BLOOD PRESSURE: 140 MMHG | WEIGHT: 315 LBS

## 2021-11-23 DIAGNOSIS — I48.19 PERSISTENT ATRIAL FIBRILLATION (HCC): Primary | ICD-10-CM

## 2021-11-23 DIAGNOSIS — Z95.2 S/P TAVR (TRANSCATHETER AORTIC VALVE REPLACEMENT): ICD-10-CM

## 2021-11-23 DIAGNOSIS — Z95.0 PACEMAKER: ICD-10-CM

## 2021-11-23 DIAGNOSIS — E66.01 MORBID OBESITY DUE TO EXCESS CALORIES (HCC): ICD-10-CM

## 2021-11-23 DIAGNOSIS — I35.0 NONRHEUMATIC AORTIC (VALVE) STENOSIS: ICD-10-CM

## 2021-11-23 DIAGNOSIS — I10 ESSENTIAL HYPERTENSION: ICD-10-CM

## 2021-11-23 DIAGNOSIS — E78.2 MIXED HYPERLIPIDEMIA: ICD-10-CM

## 2021-11-23 DIAGNOSIS — Z86.79 HISTORY OF AORTIC STENOSIS: ICD-10-CM

## 2021-11-23 PROCEDURE — 99214 OFFICE O/P EST MOD 30 MIN: CPT | Performed by: INTERNAL MEDICINE

## 2021-11-23 PROCEDURE — 4040F PNEUMOC VAC/ADMIN/RCVD: CPT | Performed by: INTERNAL MEDICINE

## 2021-11-23 PROCEDURE — G8427 DOCREV CUR MEDS BY ELIG CLIN: HCPCS | Performed by: INTERNAL MEDICINE

## 2021-11-23 PROCEDURE — G8484 FLU IMMUNIZE NO ADMIN: HCPCS | Performed by: INTERNAL MEDICINE

## 2021-11-23 PROCEDURE — 1036F TOBACCO NON-USER: CPT | Performed by: INTERNAL MEDICINE

## 2021-11-23 PROCEDURE — 1123F ACP DISCUSS/DSCN MKR DOCD: CPT | Performed by: INTERNAL MEDICINE

## 2021-11-23 PROCEDURE — 3017F COLORECTAL CA SCREEN DOC REV: CPT | Performed by: INTERNAL MEDICINE

## 2021-11-23 PROCEDURE — G8417 CALC BMI ABV UP PARAM F/U: HCPCS | Performed by: INTERNAL MEDICINE

## 2021-11-23 NOTE — PROGRESS NOTES
Chief Complaint   Patient presents with    Atrial Fibrillation    Hypertension    6 Month Follow-Up       4-26-18: Patient presents for initial medical evaluation. Patient is followed on a regular basis by Dr. Mejia Gill MD. Has been dealing with URI/bronchitis for the past week or so. Feeling tired, SOB. EKG today shows afibb at 111bpm. Does have orthopnea as well. No previous hx of afibb. No hx of MI, CHF or arrhythmias. No hx of LHC. Last stress test was 10 yrs ago. Pt denies chest pain,   nausea, vomiting, diarrhea, constipation, motor weakness, insomnia, weight loss, syncope, dizziness, lightheadedness, palpitations, PND, orthopnea, or claudication. + cough and nasal congestion. 5-3-18: feeling better overall, still SOB with mild exertion but much improved from last office visit. . s/p CXR with cardiomegaly and right pleural effusion. s/p ECHO with EF of 45%, mod LAE, Severe AS with Vmax of 4.3m/s, Peak of 74mmHg, mean of 38mmHg, AMARILYS 0.84cm2, RVSP of 35mmHg. BNP was 7000, lipid profile is ok, trop was negative. Renal function is normal. Diuresed well with lasix. HR and BP are controlled now. EKG with Afibb at 85bpm today. Pt denies chest pain,   nausea, vomiting, diarrhea, constipation, motor weakness, insomnia, weight loss, syncope, dizziness, lightheadedness, palpitations, PND, orthopnea. No LE discoloration or ulcers. + b/l  LE edema. Lipid profile is normal. No recent hospitalization. 5-15-18: s/p LHC with mild CAD, valve not able to be crossed but did not try hard to cross. s/p EMMANUEL with severe AS, EF of 35-40%, mild MR, mild AI, mild PHTN. s/p unsuccessful CV . EKG today with afibb at 84bpm. Pt denies chest pain, dyspnea, dyspnea on exertion, change in exercise capacity,  nausea, vomiting, diarrhea, constipation, motor weakness, insomnia, weight loss, syncope, dizziness, lightheadedness, palpitations, PND, orthopnea, or claudication. He feels very tired and sleeps all the time. he has an appointment with  for AV TAVR or surgery. On Eliquis and no bleeding issues. On amiodarone as well.     7-10-18: S/P TAVR AT  WITH KIRAN. WAS PLACED ON ASA/PLAVIX FOR ONE MONTH AND HE IS TO RESUME ELIQUIS AFTER. COREG WAS STOPPED AND PLACED ON CARDIZEM. HE IS NOT ON AMIODARONE. Pt denies chest pain, dyspnea, dyspnea on exertion, change in exercise capacity, fatigue,  nausea, vomiting, diarrhea, constipation, motor weakness, insomnia, weight loss, syncope, dizziness, lightheadedness, palpitations, PND, orthopnea, or claudication. NO ISSUES WITH THE PROCEDURE OTHER THAN UTI. No nitro use. BP and hr are good. CAD is stable. No LE discoloration or ulcers. No LE edema. No CHF type symptoms. Lipid profile is normal. ekg TODAY WITH AFIBB AT 90BPM.       8-23-18: HAS BEEN ON NOAC FOR AT LEAST 4 WEEKS. EKG today shows afibb with CVR. Pt denies chest pain, dyspnea, dyspnea on exertion, change in exercise capacity, fatigue,  nausea, vomiting, diarrhea, constipation, motor weakness, insomnia, weight loss, syncope, dizziness, lightheadedness, palpitations, PND, orthopnea, or claudication. No nitro use. BP and hr are good. CAD is stable. No LE discoloration or ulcers. No LE edema. No CHF type symptoms. Lipid profile is normal. No recent hospitalization. No change in meds. Uses a walker. Not able to lose weight. Does not want to have R[facility]MOND testing. 9-13-18: s/p hosp for Tikosin loading but did not tolerate, QTC was too long and was having 3-4 sec pauses. Placed on CCB and BBLocker. On NOAC, no bleeding issues. S/p CV into NSR. Was on amiodarone previously. EKG with NSR, LBBB. He does not feel really when he's in afibb. Feeling ok now. Pt denies chest pain, dyspnea, dyspnea on exertion, change in exercise capacity, fatigue,  nausea, vomiting, diarrhea, constipation, motor weakness, insomnia, weight loss, syncope, dizziness, lightheadedness, palpitations, PND, orthopnea, or claudication. 11-20-18: s/p ILR. Healing well. Pt denies chest pain, dyspnea, dyspnea on exertion, change in exercise capacity, fatigue,  nausea, vomiting, diarrhea, constipation, motor weakness, insomnia, weight loss, syncope, dizziness, lightheadedness, palpitations, PND, orthopnea, or claudication. No nitro use. BP and hr are good. CAD is stable. No LE discoloration or ulcers. No LE edema. No CHF type symptoms. Lipid profile is normal. No recent hospitalization. No change in meds. On NOAC, no bleeding issues. 19: s/p ILR with 13 pause in  and 18 sec pause in  during the day. States he has been sleeping more than usual. Remains on NOAC, no bleeding issues. Pt denies chest pain, dyspnea, dyspnea on exertion, change in exercise capacity, fatigue,  nausea, vomiting, diarrhea, constipation, motor weakness, insomnia, weight loss, syncope, dizziness, lightheadedness, palpitations, PND, orthopnea, or claudication. No nitro use. BP and hr are good. CAD is stable. No LE discoloration or ulcers. No LE edema. No CHF type symptoms. Lipid profile is normal.    3-12-19: s/p PPM dual chamber and ILR removal. S/p ECHO with normal LVF. Needs to have dental work and amoxicillin script. Pt denies chest pain, dyspnea, dyspnea on exertion, change in exercise capacity, fatigue,  nausea, vomiting, diarrhea, constipation, motor weakness, insomnia, weight loss, syncope, dizziness, lightheadedness, palpitations, PND, orthopnea, or claudication. On DOAC. No bleeding issues. Pacer healing well. EKG with NSR.     19: his wife  2 weeks ago. He is having a hard time sleeping. Hx of Pafib, on DOAC, no bleeding issues. Pt denies chest pain, dyspnea, dyspnea on exertion, change in exercise capacity, fatigue,  nausea, vomiting, diarrhea, constipation, motor weakness, insomnia, weight loss, syncope, dizziness, lightheadedness, palpitations, PND, orthopnea, or claudication. No nitro use. BP and hr are good. CAD is stable. No LE discoloration or ulcers. No LE edema. No CHF type symptoms. Lipid profile is normal. No recent hospitalization. No change in meds. 19: Pt denies chest pain, dyspnea, dyspnea on exertion, change in exercise capacity, fatigue,  nausea, vomiting, diarrhea, constipation, motor weakness, insomnia, weight loss, syncope, dizziness, lightheadedness, palpitations, PND, orthopnea, or claudication. No nitro use. BP and hr are good. CAD is stable. No LE discoloration or ulcers. No LE edema. No CHF type symptoms. Lipid profile is normal. No recent hospitalization. No change in meds. Has lost 40 pounds since his wife . EKG with ? afib or NSR. S/p PPM check in 19 with 0% afib burden. 3-12-20: Pt denies chest pain, dyspnea, dyspnea on exertion, change in exercise capacity, fatigue,  nausea, vomiting, diarrhea, constipation, motor weakness, insomnia, weight loss, syncope, dizziness, lightheadedness, palpitations, PND, orthopnea, or claudication. No nitro use. BP and hr are good. CAD is stable. No LE discoloration or ulcers. No LE edema. No CHF type symptoms. Lipid profile is normal. No recent hospitalization. No change in meds. Hx of pafib. On DOAC. Hx of PPM.   S/p ECHO with  in 2019, patient states it was ok. 10-27-20: S/P RECENT PPM check and was ok. Hx of his aortic valve replacement in 2018. History of cardiac catheterization prior to the showed mild coronary artery disease. Patient with history of paroxysmal atrial fibrillation on oral anticoagulation. History of permanent pacemaker placement. States he is feeling well overall. He denies any recent hospitalizations. No issues with his medications and he is compliant with his medication. BP and HR are ok. Sedentary lifestyle. EKG with NSR today. May 4, 2021: Patient states he is doing well overall. Patient with history of paroxysmal atrial fibrillation he is on oral anticoagulation and history of permanent pacemaker placement. He denies any bleeding issues. Patient with history of cardiac catheterization with mild CAD. He denies any recent hospitalizations. No symptoms of CHF or angina. Dealing with a left rotator cuff injury. Patient with history of severe aortic stenosis status post TAVR at [facility]. EKG today with normal sinus rhythm, nonspecific interventricular conduction delay/incomplete left bundle branch block    11-23-21: Patient is doing well overall. No recent hospitalizations. Patient with history of paroxysmal atrial fibrillation and is on oral anticoagulation. History of sick sinus syndrome status post permanent pacemaker placement. Most recent pacemaker check with no significant A. fib burden. Patient with history of severe aortic stenosis status post TAVR. EKG today shows likely normal sinus rhythm, baseline artifact and V paced rhythm. Status post echocardiogram in May 13, 2021 showing ejection fraction of 60%, normal functioning TAVR with a mean gradient of 11 mmHg. Patient Active Problem List   Diagnosis    Colon polyps    Seasonal allergies    Essential hypertension    Morbid obesity due to excess calories ([facility] [address]    Irritable bowel syndrome without diarrhea    Anemia    Mixed hyperlipidemia    Cardiac murmur    Primary osteoarthritis involving multiple joints    S/P TAVR (transcatheter aortic valve replacement)    History of aortic stenosis    Atrial fibrillation and flutter (HCC)    Nonrheumatic aortic (valve) stenosis    Pacemaker- dual chamber MRI compatible. Past Surgical History:   Procedure Laterality Date    AORTIC VALVE SURGERY  [id number]    DR. NG    CHOLECYSTECTOMY  03/2010    COLONOSCOPY  2009    INSERTABLE CARDIAC MONITOR  09/27/2018    DR. Singh  2018    KNEE SURGERY  1992    PACEMAKER INSERTION  02/19/2019    DR. PARKER       Social History     Socioeconomic History    Marital status:       Spouse name: Not on file    Number of children: Not on file  Years of education: Not on file    Highest education level: Not on file   Occupational History    Not on file   Tobacco Use    Smoking status: Never Smoker    Smokeless tobacco: Never Used   Substance and Sexual Activity    Alcohol use: No    Drug use: No    Sexual activity: Not on file   Other Topics Concern    Not on file   Social History Narrative    Not on file     Social Determinants of Health     Financial Resource Strain:     Difficulty of Paying Living Expenses: Not on file   Food Insecurity:     Worried About Running Out of Food in the Last Year: Not on file    Faheem of Food in the Last Year: Not on file   Transportation Needs:     Lack of Transportation (Medical): Not on file    Lack of Transportation (Non-Medical):  Not on file   Physical Activity:     Days of Exercise per Week: Not on file    Minutes of Exercise per Session: Not on file   Stress:     Feeling of Stress : Not on file   Social Connections:     Frequency of Communication with Friends and Family: Not on file    Frequency of Social Gatherings with Friends and Family: Not on file    Attends Congregational Services: Not on file    Active Member of [address] [place] [address] or Organizations: Not on file    Attends Club or Organization Meetings: Not on file    Marital Status: Not on file   Intimate Partner Violence:     Fear of Current or Ex-Partner: Not on file    Emotionally Abused: Not on file    Physically Abused: Not on file    Sexually Abused: Not on file   Housing Stability:     Unable to Pay for Housing in the Last Year: Not on file    Number of [place] in the Last Year: Not on file    Unstable Housing in the Last Year: Not on file       Family History   Problem Relation Age of Onset    Other Mother         BRAIN TUMOR       Current Outpatient Medications   Medication Sig Dispense Refill    CARTIA  MG extended release capsule TAKE 1 CAPSULE DAILY 90 capsule 3    ELIQUIS 5 MG TABS tablet TAKE 1 TABLET TWICE A  tablet 3    metoprolol tartrate (LOPRESSOR) 50 MG tablet TAKE 1 TABLET TWICE A  tablet 3    furosemide (LASIX) 40 MG tablet TAKE 1 TABLET DAILY 90 tablet 3    irbesartan (AVAPRO) 150 MG tablet TAKE 1 TABLET DAILY 30 tablet 0    cyclobenzaprine (FLEXERIL) 10 MG tablet TAKE 1 TABLET EVERY 8 HOURS AS NEEDED FOR MUSCLE SPASMS 180 tablet 1    irbesartan (AVAPRO) 150 MG tablet       Blood Pressure Monitor MISC 1 kit by Does not apply route 2 times daily 1 each 0    metroNIDAZOLE (METROGEL) 0.75 % gel       triamcinolone (KENALOG) 0.1 % cream Apply topically 2 times daily. 45 g 1    terazosin (HYTRIN) 10 MG capsule Take 1 capsule by mouth daily 90 capsule 3     Current Facility-Administered Medications   Medication Dose Route Frequency Provider Last Rate Last Admin    cyanocobalamin injection 1,000 mcg  1,000 mcg IntraMUSCular Once Jessica Lombard, MD           Adhesive tape    Review of Systems:  General ROS: positive for  - fatigue  Psychological ROS: negative  Hematological and Lymphatic ROS: No history of blood clots or bleeding disorder. Respiratory ROS: positive for - cough and shortness of breath  Cardiovascular ROS: positive for - dyspnea on exertion, orthopnea and shortness of breath  Gastrointestinal ROS: no abdominal pain, change in bowel habits, or black or bloody stools  Genito-Urinary ROS: no dysuria, trouble voiding, or hematuria  Musculoskeletal ROS: negative  Neurological ROS: no TIA or stroke symptoms  Dermatological ROS: negative    VITALS:  Blood pressure (!) 140/80, pulse 65, temperature 97.5 °F (36.4 °C), temperature source Infrared, weight (!) 368 lb (166.9 kg). Body mass index is 55.95 kg/m². Physical Examination:  General appearance - alert, well appearing, and in no distress and overweight  Mental status - alert, oriented to person, place, and time  Neck - Neck is supple, no JVD or carotid bruits. No thyromegaly or adenopathy.    Chest - clear to auscultation, no wheezes, rales or rhonchi, symmetric air entry  Heart - irregularly irregular rhythm with rate 100. + murmur, 3/6 systolic right sb. Abdomen - soft, nontender, nondistended, no masses or organomegaly  Neurological - alert, oriented, normal speech, no focal findings or movement disorder noted  Extremities - peripheral pulses normal, no pedal edema, no clubbing or cyanosis  Skin - normal coloration and turgor, no rashes, no suspicious skin lesions noted  Pacer site looks good. EKG: NSR. Orders Placed This Encounter   Procedures    EKG 12 Lead       ASSESSMENT:     Diagnosis Orders   1. Persistent atrial fibrillation (HCC)  EKG 12 Lead   2. S/P TAVR (transcatheter aortic valve replacement)     3. Pacemaker- dual chamber MRI compatible. 4. Nonrheumatic aortic (valve) stenosis     5. Morbid obesity due to excess calories ([address]     6. Mixed hyperlipidemia     7. History of aortic stenosis     8. Essential hypertension           PLAN:     Patient will need to continue to follow up with you for their general medical care     As always, aggressive risk factor modification is strongly recommended. We should adhere to the JNC VIII guidelines for HTN management and the NCEP ATP III guidelines for LDL-C management. Cardiac diet is always recommended with low fat, cholesterol, calories and sodium. Continue medications at current doses. Does not want to have R[place]D testing. Consider amiodarone or sotalol in future post PPM. Will await next PPM check to see if he has afib burden. Follow up with pacer clinic. Check EKG    Check ECHO given hx of TAVR in future    Multiple signs and symptoms suggestive of sleep apnea, will consider sleep study after cardiac evaluation is completed    Patient was advised and encouraged to check blood pressure at home or at a pharmacy, maintain a logbook, and also call us back if blood pressure are above the target ranges or if it is low.  Patient clearly understands and agrees to the instructions. We will need to continue to monitor muscle and liver enzymes, BUN, CR, and electrolytes. Details of medical condition explained and patient was warned about adverse consequences of uncontrolled medical conditions and possible side effects of prescribed medications.

## 2022-01-07 ENCOUNTER — TELEPHONE (OUTPATIENT)
Dept: CARDIOLOGY CLINIC | Age: 72
End: 2022-01-07

## 2022-03-07 DIAGNOSIS — I48.92 ATRIAL FIBRILLATION AND FLUTTER (HCC): ICD-10-CM

## 2022-03-07 DIAGNOSIS — I10 ESSENTIAL HYPERTENSION: ICD-10-CM

## 2022-03-07 DIAGNOSIS — I48.91 ATRIAL FIBRILLATION AND FLUTTER (HCC): ICD-10-CM

## 2022-03-07 RX ORDER — METOPROLOL TARTRATE 50 MG/1
TABLET, FILM COATED ORAL
Qty: 180 TABLET | Refills: 3 | Status: SHIPPED | OUTPATIENT
Start: 2022-03-07

## 2022-03-07 NOTE — TELEPHONE ENCOUNTER
Please approve or deny this refill request. The order is pended. Thank you.     LOV 11/23/2021    Next Visit Date:  Future Appointments   Date Time Provider Fernando Hernandez   5/31/2022  8:15 AM Ned Gonzalez, DO [facility]

## 2022-03-14 RX ORDER — FUROSEMIDE 40 MG/1
TABLET ORAL
Qty: 90 TABLET | Refills: 3 | Status: SHIPPED | OUTPATIENT
Start: 2022-03-14

## 2022-03-14 NOTE — TELEPHONE ENCOUNTER
Please approve or deny this refill request. The order is pended. Thank you.     LOV 11/23/2021    Next Visit Date:  Future Appointments   Date Time Provider Fernando Hernandez   5/31/2022  8:15 AM DO Michelle Fontenot

## 2022-03-21 NOTE — TELEPHONE ENCOUNTER
From: Simone Cruz  To: Magdy Rankin DO  Sent: 6/21/2019 2:54 PM EDT  Subject: Non-Urgent Medical Question    wh en is my next appt. with Dr. hernandez?  remember you changed it,i forgot to write it in my calender!!!!!! None

## 2022-05-31 ENCOUNTER — OFFICE VISIT (OUTPATIENT)
Dept: CARDIOLOGY CLINIC | Age: 72
End: 2022-05-31
Payer: MEDICARE

## 2022-05-31 VITALS
RESPIRATION RATE: 16 BRPM | SYSTOLIC BLOOD PRESSURE: 128 MMHG | WEIGHT: 315 LBS | BODY MASS INDEX: 47.74 KG/M2 | DIASTOLIC BLOOD PRESSURE: 86 MMHG | HEIGHT: 68 IN | HEART RATE: 84 BPM | OXYGEN SATURATION: 96 %

## 2022-05-31 DIAGNOSIS — Z95.2 S/P TAVR (TRANSCATHETER AORTIC VALVE REPLACEMENT): ICD-10-CM

## 2022-05-31 DIAGNOSIS — E78.2 MIXED HYPERLIPIDEMIA: ICD-10-CM

## 2022-05-31 DIAGNOSIS — I48.19 PERSISTENT ATRIAL FIBRILLATION (HCC): ICD-10-CM

## 2022-05-31 DIAGNOSIS — E66.01 MORBID OBESITY DUE TO EXCESS CALORIES (HCC): ICD-10-CM

## 2022-05-31 DIAGNOSIS — I10 ESSENTIAL HYPERTENSION: Primary | ICD-10-CM

## 2022-05-31 DIAGNOSIS — Z95.0 PACEMAKER: ICD-10-CM

## 2022-05-31 PROCEDURE — 3017F COLORECTAL CA SCREEN DOC REV: CPT | Performed by: INTERNAL MEDICINE

## 2022-05-31 PROCEDURE — 93000 ELECTROCARDIOGRAM COMPLETE: CPT | Performed by: INTERNAL MEDICINE

## 2022-05-31 PROCEDURE — 1036F TOBACCO NON-USER: CPT | Performed by: INTERNAL MEDICINE

## 2022-05-31 PROCEDURE — 99214 OFFICE O/P EST MOD 30 MIN: CPT | Performed by: INTERNAL MEDICINE

## 2022-05-31 PROCEDURE — 1123F ACP DISCUSS/DSCN MKR DOCD: CPT | Performed by: INTERNAL MEDICINE

## 2022-05-31 PROCEDURE — G8417 CALC BMI ABV UP PARAM F/U: HCPCS | Performed by: INTERNAL MEDICINE

## 2022-05-31 PROCEDURE — G8427 DOCREV CUR MEDS BY ELIG CLIN: HCPCS | Performed by: INTERNAL MEDICINE

## 2022-05-31 RX ORDER — TRAMADOL HYDROCHLORIDE 50 MG/1
TABLET ORAL
COMMUNITY
Start: 2022-04-12

## 2022-07-14 DIAGNOSIS — I48.19 PERSISTENT ATRIAL FIBRILLATION (HCC): ICD-10-CM

## 2022-07-14 RX ORDER — DILTIAZEM HYDROCHLORIDE 180 MG/1
CAPSULE, COATED, EXTENDED RELEASE ORAL
Qty: 90 CAPSULE | Refills: 3 | Status: SHIPPED | OUTPATIENT
Start: 2022-07-14

## 2022-07-14 NOTE — TELEPHONE ENCOUNTER
Please approve or deny this refill request. The order is pended. Thank you.     LOV 5/31/2022    Next Visit Date:  Future Appointments   Date Time Provider Fernando Hernandez   11/29/2022  8:00 AM DO Michelle Wilson

## 2022-08-17 DIAGNOSIS — I48.19 PERSISTENT ATRIAL FIBRILLATION (HCC): ICD-10-CM

## 2022-08-17 NOTE — TELEPHONE ENCOUNTER
Please approve or deny this refill request. The order is pended. Thank you.     LOV 5/31/2022    Next Visit Date:  Future Appointments   Date Time Provider Fernando Hernandez   11/29/2022  8:00 AM Ned Albert, DO [facility]

## 2022-08-19 RX ORDER — APIXABAN 5 MG/1
TABLET, FILM COATED ORAL
Qty: 180 TABLET | Refills: 3 | Status: SHIPPED | OUTPATIENT
Start: 2022-08-19

## 2022-11-29 ENCOUNTER — OFFICE VISIT (OUTPATIENT)
Dept: CARDIOLOGY CLINIC | Age: 72
End: 2022-11-29
Payer: MEDICARE

## 2022-11-29 VITALS
HEART RATE: 86 BPM | BODY MASS INDEX: 58.23 KG/M2 | WEIGHT: 315 LBS | DIASTOLIC BLOOD PRESSURE: 80 MMHG | SYSTOLIC BLOOD PRESSURE: 120 MMHG

## 2022-11-29 DIAGNOSIS — I48.91 ATRIAL FIBRILLATION AND FLUTTER (HCC): ICD-10-CM

## 2022-11-29 DIAGNOSIS — Z95.2 S/P TAVR (TRANSCATHETER AORTIC VALVE REPLACEMENT): ICD-10-CM

## 2022-11-29 DIAGNOSIS — I48.92 ATRIAL FIBRILLATION AND FLUTTER (HCC): ICD-10-CM

## 2022-11-29 DIAGNOSIS — Z95.0 PACEMAKER: ICD-10-CM

## 2022-11-29 DIAGNOSIS — Z86.79 HISTORY OF AORTIC STENOSIS: ICD-10-CM

## 2022-11-29 DIAGNOSIS — E78.2 MIXED HYPERLIPIDEMIA: ICD-10-CM

## 2022-11-29 DIAGNOSIS — I35.0 NONRHEUMATIC AORTIC (VALVE) STENOSIS: ICD-10-CM

## 2022-11-29 DIAGNOSIS — E66.01 MORBID OBESITY DUE TO EXCESS CALORIES (HCC): ICD-10-CM

## 2022-11-29 DIAGNOSIS — I10 ESSENTIAL HYPERTENSION: ICD-10-CM

## 2022-11-29 DIAGNOSIS — I48.19 PERSISTENT ATRIAL FIBRILLATION (HCC): Primary | ICD-10-CM

## 2022-11-29 PROCEDURE — 99214 OFFICE O/P EST MOD 30 MIN: CPT | Performed by: INTERNAL MEDICINE

## 2022-11-29 PROCEDURE — 3074F SYST BP LT 130 MM HG: CPT | Performed by: INTERNAL MEDICINE

## 2022-11-29 PROCEDURE — 3017F COLORECTAL CA SCREEN DOC REV: CPT | Performed by: INTERNAL MEDICINE

## 2022-11-29 PROCEDURE — G8417 CALC BMI ABV UP PARAM F/U: HCPCS | Performed by: INTERNAL MEDICINE

## 2022-11-29 PROCEDURE — G8427 DOCREV CUR MEDS BY ELIG CLIN: HCPCS | Performed by: INTERNAL MEDICINE

## 2022-11-29 PROCEDURE — G8484 FLU IMMUNIZE NO ADMIN: HCPCS | Performed by: INTERNAL MEDICINE

## 2022-11-29 PROCEDURE — 1036F TOBACCO NON-USER: CPT | Performed by: INTERNAL MEDICINE

## 2022-11-29 PROCEDURE — 3078F DIAST BP <80 MM HG: CPT | Performed by: INTERNAL MEDICINE

## 2022-11-29 PROCEDURE — 1123F ACP DISCUSS/DSCN MKR DOCD: CPT | Performed by: INTERNAL MEDICINE

## 2022-11-29 PROCEDURE — 93000 ELECTROCARDIOGRAM COMPLETE: CPT | Performed by: INTERNAL MEDICINE

## 2022-11-29 NOTE — PROGRESS NOTES
Chief Complaint   Patient presents with    Atrial Fibrillation    Hypertension    Hyperlipidemia    6 Month Follow-Up       4-26-18: Patient presents for initial medical evaluation. Patient is followed on a regular basis by Dr. Cece Cisneros MD. Has been dealing with URI/bronchitis for the past week or so. Feeling tired, SOB. EKG today shows afibb at 111bpm. Does have orthopnea as well. No previous hx of afibb. No hx of MI, CHF or arrhythmias. No hx of LHC. Last stress test was 10 yrs ago. Pt denies chest pain,   nausea, vomiting, diarrhea, constipation, motor weakness, insomnia, weight loss, syncope, dizziness, lightheadedness, palpitations, PND, orthopnea, or claudication. + cough and nasal congestion. 5-3-18: feeling better overall, still SOB with mild exertion but much improved from last office visit. . s/p CXR with cardiomegaly and right pleural effusion. s/p ECHO with EF of 45%, mod LAE, Severe AS with Vmax of 4.3m/s, Peak of 74mmHg, mean of 38mmHg, AMARILYS 0.84cm2, RVSP of 35mmHg. BNP was 7000, lipid profile is ok, trop was negative. Renal function is normal. Diuresed well with lasix. HR and BP are controlled now. EKG with Afibb at 85bpm today. Pt denies chest pain,   nausea, vomiting, diarrhea, constipation, motor weakness, insomnia, weight loss, syncope, dizziness, lightheadedness, palpitations, PND, orthopnea. No LE discoloration or ulcers. + b/l  LE edema. Lipid profile is normal. No recent hospitalization. 5-15-18: s/p LHC with mild CAD, valve not able to be crossed but did not try hard to cross. s/p EMMANUEL with severe AS, EF of 35-40%, mild MR, mild AI, mild PHTN. s/p unsuccessful CV . EKG today with afibb at 84bpm. Pt denies chest pain, dyspnea, dyspnea on exertion, change in exercise capacity,  nausea, vomiting, diarrhea, constipation, motor weakness, insomnia, weight loss, syncope, dizziness, lightheadedness, palpitations, PND, orthopnea, or claudication.  He feels very tired and sleeps all the time.he has an appointment with [facility] for AV TAVR or surgery. On Eliquis and no bleeding issues. On amiodarone as well.     7-10-18: S/P TAVR AT  WITH [facility]. WAS PLACED ON ASA/PLAVIX FOR ONE MONTH AND HE IS TO RESUME ELIQUIS AFTER. COREG WAS STOPPED AND PLACED ON CARDIZEM. HE IS NOT ON AMIODARONE. Pt denies chest pain, dyspnea, dyspnea on exertion, change in exercise capacity, fatigue,  nausea, vomiting, diarrhea, constipation, motor weakness, insomnia, weight loss, syncope, dizziness, lightheadedness, palpitations, PND, orthopnea, or claudication. NO ISSUES WITH THE PROCEDURE OTHER THAN UTI. No nitro use. BP and hr are good. CAD is stable. No LE discoloration or ulcers. No LE edema. No CHF type symptoms. Lipid profile is normal. ekg TODAY WITH AFIBB AT 90BPM.       8-23-18: HAS BEEN ON NOAC FOR AT LEAST 4 WEEKS. EKG today shows afibb with CVR. Pt denies chest pain, dyspnea, dyspnea on exertion, change in exercise capacity, fatigue,  nausea, vomiting, diarrhea, constipation, motor weakness, insomnia, weight loss, syncope, dizziness, lightheadedness, palpitations, PND, orthopnea, or claudication. No nitro use. BP and hr are good. CAD is stable. No LE discoloration or ulcers. No LE edema. No CHF type symptoms. Lipid profile is normal. No recent hospitalization. No change in meds. Uses a walker. Not able to lose weight. Does not want to have R[place]MOND testing. 9-13-18: s/p hosp for Tikosin loading but did not tolerate, QTC was too long and was having 3-4 sec pauses. Placed on CCB and BBLocker. On NOAC, no bleeding issues. S/p CV into NSR. Was on amiodarone previously. EKG with NSR, LBBB. He does not feel really when he's in afibb. Feeling ok now. Pt denies chest pain, dyspnea, dyspnea on exertion, change in exercise capacity, fatigue,  nausea, vomiting, diarrhea, constipation, motor weakness, insomnia, weight loss, syncope, dizziness, lightheadedness, palpitations, PND, orthopnea, or claudication.     11-20-18: s/p ILR. Healing well. Pt denies chest pain, dyspnea, dyspnea on exertion, change in exercise capacity, fatigue,  nausea, vomiting, diarrhea, constipation, motor weakness, insomnia, weight loss, syncope, dizziness, lightheadedness, palpitations, PND, orthopnea, or claudication. No nitro use. BP and hr are good. CAD is stable. No LE discoloration or ulcers. No LE edema. No CHF type symptoms. Lipid profile is normal. No recent hospitalization. No change in meds. On NOAC, no bleeding issues. 19: s/p ILR with 13 pause in  and 18 sec pause in  during the day. States he has been sleeping more than usual. Remains on NOAC, no bleeding issues. Pt denies chest pain, dyspnea, dyspnea on exertion, change in exercise capacity, fatigue,  nausea, vomiting, diarrhea, constipation, motor weakness, insomnia, weight loss, syncope, dizziness, lightheadedness, palpitations, PND, orthopnea, or claudication. No nitro use. BP and hr are good. CAD is stable. No LE discoloration or ulcers. No LE edema. No CHF type symptoms. Lipid profile is normal.    3-12-19: s/p PPM dual chamber and ILR removal. S/p ECHO with normal LVF. Needs to have dental work and amoxicillin script. Pt denies chest pain, dyspnea, dyspnea on exertion, change in exercise capacity, fatigue,  nausea, vomiting, diarrhea, constipation, motor weakness, insomnia, weight loss, syncope, dizziness, lightheadedness, palpitations, PND, orthopnea, or claudication. On DOAC. No bleeding issues. Pacer healing well. EKG with NSR.     19: his wife  2 weeks ago. He is having a hard time sleeping. Hx of Pafib, on DOAC, no bleeding issues. Pt denies chest pain, dyspnea, dyspnea on exertion, change in exercise capacity, fatigue,  nausea, vomiting, diarrhea, constipation, motor weakness, insomnia, weight loss, syncope, dizziness, lightheadedness, palpitations, PND, orthopnea, or claudication. No nitro use. BP and hr are good. CAD is stable.  No LE discoloration or ulcers. No LE edema. No CHF type symptoms. Lipid profile is normal. No recent hospitalization. No change in meds. 19: Pt denies chest pain, dyspnea, dyspnea on exertion, change in exercise capacity, fatigue,  nausea, vomiting, diarrhea, constipation, motor weakness, insomnia, weight loss, syncope, dizziness, lightheadedness, palpitations, PND, orthopnea, or claudication. No nitro use. BP and hr are good. CAD is stable. No LE discoloration or ulcers. No LE edema. No CHF type symptoms. Lipid profile is normal. No recent hospitalization. No change in meds. Has lost 40 pounds since his wife . EKG with ? afib or NSR. S/p PPM check in 19 with 0% afib burden. 3-12-20: Pt denies chest pain, dyspnea, dyspnea on exertion, change in exercise capacity, fatigue,  nausea, vomiting, diarrhea, constipation, motor weakness, insomnia, weight loss, syncope, dizziness, lightheadedness, palpitations, PND, orthopnea, or claudication. No nitro use. BP and hr are good. CAD is stable. No LE discoloration or ulcers. No LE edema. No CHF type symptoms. Lipid profile is normal. No recent hospitalization. No change in meds. Hx of pafib. On DOAC. Hx of PPM.   S/p ECHO with  in 2019, patient states it was ok. 10-27-20: S/P RECENT PPM check and was ok. Hx of his aortic valve replacement in 2018. History of cardiac catheterization prior to the showed mild coronary artery disease. Patient with history of paroxysmal atrial fibrillation on oral anticoagulation. History of permanent pacemaker placement. States he is feeling well overall. He denies any recent hospitalizations. No issues with his medications and he is compliant with his medication. BP and HR are ok. Sedentary lifestyle. EKG with NSR today. May 4, 2021: Patient states he is doing well overall. Patient with history of paroxysmal atrial fibrillation he is on oral anticoagulation and history of permanent pacemaker placement.   He denies any bleeding issues. Patient with history of cardiac catheterization with mild CAD. He denies any recent hospitalizations. No symptoms of CHF or angina. Dealing with a left rotator cuff injury. Patient with history of severe aortic stenosis status post TAVR at [facility]. EKG today with normal sinus rhythm, nonspecific interventricular conduction delay/incomplete left bundle branch block    11-23-21: Patient is doing well overall. No recent hospitalizations. Patient with history of paroxysmal atrial fibrillation and is on oral anticoagulation. History of sick sinus syndrome status post permanent pacemaker placement. Most recent pacemaker check with no significant A. fib burden. Patient with history of severe aortic stenosis status post TAVR. EKG today shows likely normal sinus rhythm, baseline artifact and V paced rhythm. Status post echocardiogram in May 13, 2021 showing ejection fraction of 60%, normal functioning TAVR with a mean gradient of 11 mmHg. 5-31-22:  Patient with history of paroxysmal atrial fibrillation and is on oral anticoagulation. No bleeding issues. History of sick sinus syndrome status post permanent pacemaker placement. Most recent pacemaker check with no significant A. fib burden. Patient with history of severe aortic stenosis status post TAVR. No angina or CHF type symptoms. EKG with NSR, LBBB. 11-29-22: hx of pafib. Hx of SSS s/ pPPM. Patient with history of severe aortic stenosis status post TAVR. Pt denies chest pain, dyspnea, dyspnea on exertion, change in exercise capacity, nausea, vomiting, diarrhea, constipation, motor weakness, insomnia, weight loss, syncope, dizziness, lightheadedness, palpitations, PND, orthopnea, or claudication. last echo in 5/2021 with normal functioning TAVR. On DOAC. No bleeding issues. Last PPM check in 8/2022 with 2.3% afib burden, longest episode was 7 hours.    EKG with NSR        Patient Active Problem List   Diagnosis    Colon polyps Take 1 capsule by mouth daily 90 capsule 3     Current Facility-Administered Medications   Medication Dose Route Frequency Provider Last Rate Last Admin    cyanocobalamin injection 1,000 mcg  1,000 mcg IntraMUSCular Once Martell Fajardo MD           Adhesive tape    Review of Systems:  General ROS: positive for  - fatigue  Psychological ROS: negative  Hematological and Lymphatic ROS: No history of blood clots or bleeding disorder. Respiratory ROS: positive for - cough and shortness of breath  Cardiovascular ROS: positive for - dyspnea on exertion, orthopnea and shortness of breath  Gastrointestinal ROS: no abdominal pain, change in bowel habits, or black or bloody stools  Genito-Urinary ROS: no dysuria, trouble voiding, or hematuria  Musculoskeletal ROS: negative  Neurological ROS: no TIA or stroke symptoms  Dermatological ROS: negative    VITALS:  Blood pressure 120/80, pulse 86, weight (!) 383 lb (173.7 kg). Body mass index is 58.23 kg/m². Physical Examination:  General appearance - alert, well appearing, and in no distress and overweight  Mental status - alert, oriented to person, place, and time  Neck - Neck is supple, no JVD or carotid bruits. No thyromegaly or adenopathy. Chest - clear to auscultation, no wheezes, rales or rhonchi, symmetric air entry  Heart - irregularly irregular rhythm with rate 100. + murmur, 3/6 systolic right sb. Abdomen - soft, nontender, nondistended, no masses or organomegaly  Neurological - alert, oriented, normal speech, no focal findings or movement disorder noted  Extremities - peripheral pulses normal, no pedal edema, no clubbing or cyanosis  Skin - normal coloration and turgor, no rashes, no suspicious skin lesions noted  Pacer site looks good. EKG: NSR. Orders Placed This Encounter   Procedures    EKG 12 Lead       ASSESSMENT:     Diagnosis Orders   1. Persistent atrial fibrillation (HCC)  EKG 12 Lead      2. Essential hypertension        3.  History of aortic stenosis        4. Atrial fibrillation and flutter ([address]        5. Mixed hyperlipidemia        6. Morbid obesity due to excess calories ([address]        7. Nonrheumatic aortic (valve) stenosis        8. Pacemaker- dual chamber MRI compatible. 9. S/P TAVR (transcatheter aortic valve replacement)              PLAN:     Patient will need to continue to follow up with you for their general medical care     As always, aggressive risk factor modification is strongly recommended. We should adhere to the JNC VIII guidelines for HTN management and the NCEP ATP III guidelines for LDL-C management. Cardiac diet is always recommended with low fat, cholesterol, calories and sodium. Continue medications at current doses. Does not want to have R[place]D testing. Consider amiodarone or sotalol in future. Will await next PPM check to see if he has increased afib burden. Follow up with pacer clinic. Check EKG    Check ECHO given hx of TAVR in future. Next OV    Follow up with Pacer clinic. Multiple signs and symptoms suggestive of sleep apnea, will consider sleep study after cardiac evaluation is completed    Patient was advised and encouraged to check blood pressure at home or at a pharmacy, maintain a logbook, and also call us back if blood pressure are above the target ranges or if it is low. Patient clearly understands and agrees to the instructions. We will need to continue to monitor muscle and liver enzymes, BUN, CR, and electrolytes. Details of medical condition explained and patient was warned about adverse consequences of uncontrolled medical conditions and possible side effects of prescribed medications.

## 2023-02-08 DIAGNOSIS — I48.92 ATRIAL FIBRILLATION AND FLUTTER (HCC): ICD-10-CM

## 2023-02-08 DIAGNOSIS — I10 ESSENTIAL HYPERTENSION: ICD-10-CM

## 2023-02-08 DIAGNOSIS — I48.91 ATRIAL FIBRILLATION AND FLUTTER (HCC): ICD-10-CM

## 2023-02-08 NOTE — TELEPHONE ENCOUNTER
Pharmacy via fax is requesting medication refill.  Please approve or deny this request.    Rx requested:  Requested Prescriptions      No prescriptions requested or ordered in this encounter         Last Office Visit:   11/29/2022      Next Visit Date:  Future Appointments   Date Time Provider Fernando Hernandez   5/23/2023  8:15 AM Ned Reid, DO [facility]

## 2023-02-09 RX ORDER — METOPROLOL TARTRATE 50 MG/1
TABLET, FILM COATED ORAL
Qty: 180 TABLET | Refills: 3 | Status: SHIPPED | OUTPATIENT
Start: 2023-02-09

## 2023-02-15 PROBLEM — D64.9 ANEMIA: Status: ACTIVE | Noted: 2023-02-15

## 2023-02-15 PROBLEM — M17.9 KNEE OSTEOARTHRITIS: Status: ACTIVE | Noted: 2023-02-15

## 2023-02-15 PROBLEM — E66.01 CLASS 3 SEVERE OBESITY WITH BODY MASS INDEX (BMI) OF 50.0 TO 59.9 IN ADULT (MULTI): Status: ACTIVE | Noted: 2023-02-15

## 2023-02-15 PROBLEM — I50.9 CHRONIC CHF (MULTI): Status: ACTIVE | Noted: 2023-02-15

## 2023-02-15 PROBLEM — M62.838 MUSCLE SPASM: Status: ACTIVE | Noted: 2023-02-15

## 2023-02-15 PROBLEM — R42 VERTIGO: Status: ACTIVE | Noted: 2023-02-15

## 2023-02-15 PROBLEM — E66.813 CLASS 3 SEVERE OBESITY WITH BODY MASS INDEX (BMI) OF 50.0 TO 59.9 IN ADULT: Status: ACTIVE | Noted: 2023-02-15

## 2023-02-15 PROBLEM — F32.0 DEPRESSION, MAJOR, SINGLE EPISODE, MILD (CMS-HCC): Status: ACTIVE | Noted: 2023-02-15

## 2023-02-15 PROBLEM — R60.9 EDEMA: Status: ACTIVE | Noted: 2023-02-15

## 2023-02-15 PROBLEM — Z95.2 STATUS POST TRANSCATHETER AORTIC VALVE REPLACEMENT: Status: ACTIVE | Noted: 2023-02-15

## 2023-02-15 PROBLEM — I10 BENIGN ESSENTIAL HTN: Status: ACTIVE | Noted: 2023-02-15

## 2023-02-15 PROBLEM — H90.3 ASYMMETRIC SNHL (SENSORINEURAL HEARING LOSS): Status: ACTIVE | Noted: 2023-02-15

## 2023-02-15 PROBLEM — H93.12 TINNITUS OF LEFT EAR: Status: RESOLVED | Noted: 2023-02-15 | Resolved: 2023-02-15

## 2023-02-15 PROBLEM — R42 DIZZINESS: Status: ACTIVE | Noted: 2023-02-15

## 2023-02-15 PROBLEM — M25.569 PAIN IN UNSPECIFIED KNEE: Status: ACTIVE | Noted: 2023-02-15

## 2023-02-15 PROBLEM — I48.91 AF (ATRIAL FIBRILLATION) (MULTI): Status: ACTIVE | Noted: 2023-02-15

## 2023-02-15 PROBLEM — N39.0 RECURRENT UTI: Status: RESOLVED | Noted: 2023-02-15 | Resolved: 2023-02-15

## 2023-02-15 RX ORDER — TRAMADOL HYDROCHLORIDE 50 MG/1
TABLET ORAL
COMMUNITY
Start: 2021-06-26 | End: 2023-10-10 | Stop reason: ALTCHOICE

## 2023-02-15 RX ORDER — METOPROLOL TARTRATE 25 MG/1
TABLET, FILM COATED ORAL
COMMUNITY

## 2023-02-15 RX ORDER — IRBESARTAN 150 MG/1
TABLET ORAL
COMMUNITY
Start: 2018-04-16 | End: 2023-04-11 | Stop reason: SDUPTHER

## 2023-02-15 RX ORDER — FUROSEMIDE 40 MG/1
TABLET ORAL
COMMUNITY
End: 2024-04-18

## 2023-02-15 RX ORDER — TERAZOSIN 10 MG/1
CAPSULE ORAL
COMMUNITY
Start: 2022-02-09 | End: 2023-11-30

## 2023-02-15 RX ORDER — CYCLOBENZAPRINE HCL 10 MG
TABLET ORAL
COMMUNITY

## 2023-02-15 RX ORDER — CARVEDILOL 6.25 MG/1
TABLET ORAL
COMMUNITY
End: 2023-10-09 | Stop reason: ALTCHOICE

## 2023-02-15 RX ORDER — TRIAMCINOLONE ACETONIDE 1 MG/G
CREAM TOPICAL
COMMUNITY

## 2023-02-15 RX ORDER — DILTIAZEM HYDROCHLORIDE 180 MG/1
1 CAPSULE, EXTENDED RELEASE ORAL DAILY
COMMUNITY

## 2023-04-11 ENCOUNTER — LAB (OUTPATIENT)
Dept: LAB | Facility: LAB | Age: 73
End: 2023-04-11
Payer: MEDICARE

## 2023-04-11 ENCOUNTER — OFFICE VISIT (OUTPATIENT)
Dept: PRIMARY CARE | Facility: CLINIC | Age: 73
End: 2023-04-11
Payer: MEDICARE

## 2023-04-11 VITALS
HEART RATE: 91 BPM | BODY MASS INDEX: 47.74 KG/M2 | HEIGHT: 68 IN | TEMPERATURE: 97.4 F | DIASTOLIC BLOOD PRESSURE: 72 MMHG | OXYGEN SATURATION: 97 % | SYSTOLIC BLOOD PRESSURE: 124 MMHG | WEIGHT: 315 LBS

## 2023-04-11 DIAGNOSIS — E78.2 MIXED HYPERLIPIDEMIA: ICD-10-CM

## 2023-04-11 DIAGNOSIS — Z12.5 PROSTATE CANCER SCREENING: ICD-10-CM

## 2023-04-11 DIAGNOSIS — Z00.00 ROUTINE GENERAL MEDICAL EXAMINATION AT HEALTH CARE FACILITY: Primary | ICD-10-CM

## 2023-04-11 DIAGNOSIS — M15.9 PRIMARY OSTEOARTHRITIS INVOLVING MULTIPLE JOINTS: ICD-10-CM

## 2023-04-11 DIAGNOSIS — I50.42 CHRONIC COMBINED SYSTOLIC AND DIASTOLIC CONGESTIVE HEART FAILURE (MULTI): ICD-10-CM

## 2023-04-11 DIAGNOSIS — E66.01 CLASS 3 SEVERE OBESITY DUE TO EXCESS CALORIES WITH SERIOUS COMORBIDITY AND BODY MASS INDEX (BMI) OF 50.0 TO 59.9 IN ADULT (MULTI): ICD-10-CM

## 2023-04-11 DIAGNOSIS — I48.20 CHRONIC ATRIAL FIBRILLATION (MULTI): ICD-10-CM

## 2023-04-11 DIAGNOSIS — F32.0 DEPRESSION, MAJOR, SINGLE EPISODE, MILD (CMS-HCC): ICD-10-CM

## 2023-04-11 DIAGNOSIS — J30.2 SEASONAL ALLERGIES: ICD-10-CM

## 2023-04-11 DIAGNOSIS — K58.9 IRRITABLE BOWEL SYNDROME WITHOUT DIARRHEA: ICD-10-CM

## 2023-04-11 PROBLEM — I35.0 NONRHEUMATIC AORTIC (VALVE) STENOSIS: Status: ACTIVE | Noted: 2018-08-16

## 2023-04-11 PROBLEM — Z86.79 HISTORY OF AORTIC STENOSIS: Status: RESOLVED | Noted: 2018-07-10 | Resolved: 2023-04-11

## 2023-04-11 PROBLEM — E66.813 CLASS 3 SEVERE OBESITY DUE TO EXCESS CALORIES WITH SERIOUS COMORBIDITY AND BODY MASS INDEX (BMI) OF 50.0 TO 59.9 IN ADULT: Status: ACTIVE | Noted: 2023-04-11

## 2023-04-11 PROBLEM — R01.1 CARDIAC MURMUR: Status: ACTIVE | Noted: 2018-04-26

## 2023-04-11 PROBLEM — M15.0 PRIMARY OSTEOARTHRITIS INVOLVING MULTIPLE JOINTS: Status: ACTIVE | Noted: 2018-05-01

## 2023-04-11 PROBLEM — I48.91 ATRIAL FIBRILLATION AND FLUTTER (MULTI): Status: ACTIVE | Noted: 2018-08-16

## 2023-04-11 PROBLEM — I48.92 ATRIAL FIBRILLATION AND FLUTTER (MULTI): Status: ACTIVE | Noted: 2018-08-16

## 2023-04-11 PROBLEM — Z95.0 PACEMAKER: Status: ACTIVE | Noted: 2019-03-12

## 2023-04-11 PROBLEM — Z95.2 S/P TAVR (TRANSCATHETER AORTIC VALVE REPLACEMENT): Status: ACTIVE | Noted: 2018-08-16

## 2023-04-11 LAB
ALANINE AMINOTRANSFERASE (SGPT) (U/L) IN SER/PLAS: 11 U/L (ref 10–52)
ALBUMIN (G/DL) IN SER/PLAS: 4.2 G/DL (ref 3.4–5)
ALKALINE PHOSPHATASE (U/L) IN SER/PLAS: 60 U/L (ref 33–136)
ANION GAP IN SER/PLAS: 14 MMOL/L (ref 10–20)
ASPARTATE AMINOTRANSFERASE (SGOT) (U/L) IN SER/PLAS: 17 U/L (ref 9–39)
BASOPHILS (10*3/UL) IN BLOOD BY AUTOMATED COUNT: 0.04 X10E9/L (ref 0–0.1)
BASOPHILS/100 LEUKOCYTES IN BLOOD BY AUTOMATED COUNT: 0.5 % (ref 0–2)
BILIRUBIN TOTAL (MG/DL) IN SER/PLAS: 0.5 MG/DL (ref 0–1.2)
CALCIUM (MG/DL) IN SER/PLAS: 9.4 MG/DL (ref 8.6–10.3)
CARBON DIOXIDE, TOTAL (MMOL/L) IN SER/PLAS: 29 MMOL/L (ref 21–32)
CHLORIDE (MMOL/L) IN SER/PLAS: 101 MMOL/L (ref 98–107)
CHOLESTEROL (MG/DL) IN SER/PLAS: 195 MG/DL (ref 0–199)
CHOLESTEROL IN HDL (MG/DL) IN SER/PLAS: 42.7 MG/DL
CHOLESTEROL/HDL RATIO: 4.6
CREATININE (MG/DL) IN SER/PLAS: 0.96 MG/DL (ref 0.5–1.3)
EOSINOPHILS (10*3/UL) IN BLOOD BY AUTOMATED COUNT: 0.35 X10E9/L (ref 0–0.4)
EOSINOPHILS/100 LEUKOCYTES IN BLOOD BY AUTOMATED COUNT: 4.7 % (ref 0–6)
ERYTHROCYTE DISTRIBUTION WIDTH (RATIO) BY AUTOMATED COUNT: 12.8 % (ref 11.5–14.5)
ERYTHROCYTE MEAN CORPUSCULAR HEMOGLOBIN CONCENTRATION (G/DL) BY AUTOMATED: 32.1 G/DL (ref 32–36)
ERYTHROCYTE MEAN CORPUSCULAR VOLUME (FL) BY AUTOMATED COUNT: 107 FL (ref 80–100)
ERYTHROCYTES (10*6/UL) IN BLOOD BY AUTOMATED COUNT: 3.78 X10E12/L (ref 4.5–5.9)
GFR MALE: 83 ML/MIN/1.73M2
GLUCOSE (MG/DL) IN SER/PLAS: 106 MG/DL (ref 74–99)
HEMATOCRIT (%) IN BLOOD BY AUTOMATED COUNT: 40.5 % (ref 41–52)
HEMOGLOBIN (G/DL) IN BLOOD: 13 G/DL (ref 13.5–17.5)
IMMATURE GRANULOCYTES/100 LEUKOCYTES IN BLOOD BY AUTOMATED COUNT: 0.3 % (ref 0–0.9)
LDL: 130 MG/DL (ref 0–99)
LEUKOCYTES (10*3/UL) IN BLOOD BY AUTOMATED COUNT: 7.4 X10E9/L (ref 4.4–11.3)
LYMPHOCYTES (10*3/UL) IN BLOOD BY AUTOMATED COUNT: 1.14 X10E9/L (ref 0.8–3)
LYMPHOCYTES/100 LEUKOCYTES IN BLOOD BY AUTOMATED COUNT: 15.4 % (ref 13–44)
MONOCYTES (10*3/UL) IN BLOOD BY AUTOMATED COUNT: 0.63 X10E9/L (ref 0.05–0.8)
MONOCYTES/100 LEUKOCYTES IN BLOOD BY AUTOMATED COUNT: 8.5 % (ref 2–10)
NEUTROPHILS (10*3/UL) IN BLOOD BY AUTOMATED COUNT: 5.21 X10E9/L (ref 1.6–5.5)
NEUTROPHILS/100 LEUKOCYTES IN BLOOD BY AUTOMATED COUNT: 70.6 % (ref 40–80)
PLATELETS (10*3/UL) IN BLOOD AUTOMATED COUNT: 216 X10E9/L (ref 150–450)
POTASSIUM (MMOL/L) IN SER/PLAS: 4.3 MMOL/L (ref 3.5–5.3)
PROSTATE SPECIFIC AG (NG/ML) IN SER/PLAS: 4.01 NG/ML (ref 0–4)
PROTEIN TOTAL: 7.2 G/DL (ref 6.4–8.2)
SODIUM (MMOL/L) IN SER/PLAS: 140 MMOL/L (ref 136–145)
TRIGLYCERIDE (MG/DL) IN SER/PLAS: 114 MG/DL (ref 0–149)
UREA NITROGEN (MG/DL) IN SER/PLAS: 26 MG/DL (ref 6–23)
VLDL: 23 MG/DL (ref 0–40)

## 2023-04-11 PROCEDURE — 36415 COLL VENOUS BLD VENIPUNCTURE: CPT

## 2023-04-11 PROCEDURE — 84153 ASSAY OF PSA TOTAL: CPT

## 2023-04-11 PROCEDURE — 80061 LIPID PANEL: CPT

## 2023-04-11 PROCEDURE — 3078F DIAST BP <80 MM HG: CPT | Performed by: FAMILY MEDICINE

## 2023-04-11 PROCEDURE — 1159F MED LIST DOCD IN RCRD: CPT | Performed by: FAMILY MEDICINE

## 2023-04-11 PROCEDURE — 99214 OFFICE O/P EST MOD 30 MIN: CPT | Performed by: FAMILY MEDICINE

## 2023-04-11 PROCEDURE — 3008F BODY MASS INDEX DOCD: CPT | Performed by: FAMILY MEDICINE

## 2023-04-11 PROCEDURE — 1036F TOBACCO NON-USER: CPT | Performed by: FAMILY MEDICINE

## 2023-04-11 PROCEDURE — 3074F SYST BP LT 130 MM HG: CPT | Performed by: FAMILY MEDICINE

## 2023-04-11 PROCEDURE — 80053 COMPREHEN METABOLIC PANEL: CPT

## 2023-04-11 PROCEDURE — G0439 PPPS, SUBSEQ VISIT: HCPCS | Performed by: FAMILY MEDICINE

## 2023-04-11 PROCEDURE — 1170F FXNL STATUS ASSESSED: CPT | Performed by: FAMILY MEDICINE

## 2023-04-11 PROCEDURE — 85025 COMPLETE CBC W/AUTO DIFF WBC: CPT

## 2023-04-11 PROCEDURE — 1160F RVW MEDS BY RX/DR IN RCRD: CPT | Performed by: FAMILY MEDICINE

## 2023-04-11 RX ORDER — IRBESARTAN 150 MG/1
150 TABLET ORAL DAILY
Qty: 90 TABLET | Refills: 3 | Status: SHIPPED | OUTPATIENT
Start: 2023-04-11 | End: 2024-04-15

## 2023-04-11 ASSESSMENT — ENCOUNTER SYMPTOMS
DIARRHEA: 0
ABDOMINAL PAIN: 0
FATIGUE: 0
ARTHRALGIAS: 0
ACTIVITY CHANGE: 0
NECK PAIN: 0
CONSTIPATION: 0
HEMATURIA: 0
WEAKNESS: 0
NAUSEA: 0
LOSS OF SENSATION IN FEET: 0
VOMITING: 0
NUMBNESS: 0
CHEST TIGHTNESS: 0
EYE DISCHARGE: 0
SORE THROAT: 0
MYALGIAS: 0
DIFFICULTY URINATING: 0
ADENOPATHY: 0
SHORTNESS OF BREATH: 0
DYSPHORIC MOOD: 0
DEPRESSION: 0
COUGH: 0
BLOOD IN STOOL: 0
HEADACHES: 0
NERVOUS/ANXIOUS: 0
OCCASIONAL FEELINGS OF UNSTEADINESS: 0
BACK PAIN: 0
BRUISES/BLEEDS EASILY: 0
DIZZINESS: 0

## 2023-04-11 ASSESSMENT — ACTIVITIES OF DAILY LIVING (ADL)
BATHING: INDEPENDENT
DOING_HOUSEWORK: INDEPENDENT
DRESSING: INDEPENDENT
MANAGING_FINANCES: INDEPENDENT
GROCERY_SHOPPING: INDEPENDENT
TAKING_MEDICATION: INDEPENDENT

## 2023-04-11 ASSESSMENT — PATIENT HEALTH QUESTIONNAIRE - PHQ9
2. FEELING DOWN, DEPRESSED OR HOPELESS: NOT AT ALL
SUM OF ALL RESPONSES TO PHQ9 QUESTIONS 1 AND 2: 0
1. LITTLE INTEREST OR PLEASURE IN DOING THINGS: NOT AT ALL

## 2023-04-11 NOTE — PROGRESS NOTES
Subjective   Reason for Visit: Mag Pierce is an 73 y.o. male here for a Medicare Wellness visit.     Past Medical, Surgical, and Family History reviewed and updated in chart.    Reviewed all medications by prescribing practitioner or clinical pharmacist (such as prescriptions, OTCs, herbal therapies and supplements) and documented in the medical record.    HPI    Patient Care Team:  David Varghese MD as PCP - General  David Varghese MD as PCP [facility]   Annual well visit    Medicare wellness    Checkup visit as well    CHF stable  See cardio    Depressed mood stable  Tolerates medicine  No suicidal ideation  No psychotic or manic symptoms    Chronic A-fib stable  Keep cardio follow-up    Obese  Recommend weight loss  Recommend diet and exercise    High cholesterol stable  Watch diet tolerate medicine    Seasonal allergies stable worse about this time of year    Osteoarthritis stable  Multiple joints affected  Has seen orthopedics    Irritable bowel stable  No red flag symptoms    Due for prostate cancer screening    Stay current with colorectal cancer screening as well he will consider  Review of Systems   Constitutional:  Negative for activity change and fatigue.   HENT:  Negative for congestion and sore throat.    Eyes:  Negative for discharge.   Respiratory:  Negative for cough, chest tightness and shortness of breath.    Cardiovascular:  Negative for chest pain and leg swelling.   Gastrointestinal:  Negative for abdominal pain, blood in stool, constipation, diarrhea, nausea and vomiting.   Endocrine: Negative for cold intolerance and heat intolerance.   Genitourinary:  Negative for difficulty urinating and hematuria.   Musculoskeletal:  Negative for arthralgias, back pain, gait problem, myalgias and neck pain.   Allergic/Immunologic: Negative for environmental allergies.   Neurological:  Negative for dizziness, syncope, weakness, numbness and headaches.   Hematological:   "Negative for adenopathy. Does not bruise/bleed easily.   Psychiatric/Behavioral:  Negative for dysphoric mood. The patient is not nervous/anxious.    All other systems reviewed and are negative.      Objective   Vitals:  /72 (BP Location: Right arm, BP Cuff Size: Large adult)   Pulse 91   Temp 36.3 °C (97.4 °F)   Ht 1.727 m (5' 8\")   Wt (!) 174 kg (383 lb)   SpO2 97%   BMI 58.23 kg/m²       Physical Exam  Vitals and nursing note reviewed.   Constitutional:       General: He is not in acute distress.     Appearance: Normal appearance. He is obese.   HENT:      Head: Normocephalic and atraumatic.      Right Ear: Tympanic membrane, ear canal and external ear normal.      Left Ear: Tympanic membrane, ear canal and external ear normal.      Nose: Nose normal.      Mouth/Throat:      Mouth: Mucous membranes are moist.      Pharynx: Oropharynx is clear. No oropharyngeal exudate or posterior oropharyngeal erythema.   Eyes:      Extraocular Movements: Extraocular movements intact.      Conjunctiva/sclera: Conjunctivae normal.      Pupils: Pupils are equal, round, and reactive to light.   Cardiovascular:      Rate and Rhythm: Normal rate and regular rhythm.      Pulses: Normal pulses.      Heart sounds: Normal heart sounds. No murmur heard.  Pulmonary:      Effort: Pulmonary effort is normal. No respiratory distress.      Breath sounds: Normal breath sounds. No wheezing or rales.   Abdominal:      General: Abdomen is flat. Bowel sounds are normal. There is no distension.      Palpations: Abdomen is soft. There is no mass.      Tenderness: There is no abdominal tenderness.   Musculoskeletal:         General: No swelling or deformity. Normal range of motion.      Cervical back: Normal range of motion and neck supple.      Right lower leg: No edema.      Left lower leg: No edema.   Lymphadenopathy:      Cervical: No cervical adenopathy.   Skin:     General: Skin is warm and dry.      Capillary Refill: Capillary " refill takes less than 2 seconds.      Findings: No lesion or rash.   Neurological:      General: No focal deficit present.      Mental Status: He is alert and oriented to person, place, and time.      Cranial Nerves: No cranial nerve deficit.      Motor: No weakness.   Psychiatric:         Mood and Affect: Mood normal.         Behavior: Behavior normal.         Thought Content: Thought content normal.         Judgment: Judgment normal.         Assessment/Plan   Problem List Items Addressed This Visit          Circulatory    Chronic CHF (CMS/HCC)    Chronic atrial fibrillation (CMS/HCC)       Digestive    Irritable bowel syndrome without diarrhea       Musculoskeletal    Primary osteoarthritis involving multiple joints       Endocrine/Metabolic    Class 3 severe obesity due to excess calories with serious comorbidity and body mass index (BMI) of 50.0 to 59.9 in adult (CMS/HCC)       Other    Depression, major, single episode, mild (CMS/HCC)    Mixed hyperlipidemia    Seasonal allergies    Routine general medical examination at health care facility - Primary     Patient education provided.  Stay current with age appropriate health maintenance as instructed.  Appointment here or ER with new or worsening symptoms'  Keep appropriate follow-up visit.  Stay current with proper immunizations  6-month recheck  Return sooner with new or worsening symptoms  Report suicidal ideation report psychotic or manic symptoms

## 2023-04-11 NOTE — PROGRESS NOTES
"Subjective   Reason for Visit: Mag Pierce is an 73 y.o. male here for a Medicare Wellness visit and 6 month follow up on HTN.     Discuss swelling in fingers, some days worse then others. Having trouble bending fingers.          Reviewed all medications by prescribing practitioner or clinical pharmacist (such as prescriptions, OTCs, herbal therapies and supplements) and documented in the medical record.    HPI    Patient Care Team:  David Varghese MD as PCP - General  David Varghese MD [facility] PCP [facility]     Review of Systems    Objective   Vitals:  /72 (BP Location: Right arm, BP Cuff Size: Large adult)   Pulse 91   Temp 36.3 °C (97.4 °F)   Ht 1.727 m (5' 8\")   Wt (!) 174 kg (383 lb)   SpO2 97%   BMI 58.23 kg/m²       Physical Exam    Assessment/Plan   Problem List Items Addressed This Visit    None         "

## 2023-04-19 NOTE — PROGRESS NOTES
Chief Complaint   Patient presents with    Atrial Fibrillation    Hypertension       4-26-18: Patient presents for initial medical evaluation. Patient is followed on a regular basis by Dr. Chantal Valentin MD. Has been dealing with URI/bronchitis for the past week or so. Feeling tired, SOB. EKG today shows afibb at 111bpm. Does have orthopnea as well. No previous hx of afibb. No hx of MI, CHF or arrhythmias. No hx of LHC. Last stress test was 10 yrs ago. Pt denies chest pain,   nausea, vomiting, diarrhea, constipation, motor weakness, insomnia, weight loss, syncope, dizziness, lightheadedness, palpitations, PND, orthopnea, or claudication. + cough and nasal congestion. 5-3-18: feeling better overall, still SOB with mild exertion but much improved from last office visit. . s/p CXR with cardiomegaly and right pleural effusion. s/p ECHO with EF of 45%, mod LAE, Severe AS with Vmax of 4.3m/s, Peak of 74mmHg, mean of 38mmHg, AMARILYS 0.84cm2, RVSP of 35mmHg. BNP was 7000, lipid profile is ok, trop was negative. Renal function is normal. Diuresed well with lasix. HR and BP are controlled now. EKG with Afibb at 85bpm today. Pt denies chest pain,   nausea, vomiting, diarrhea, constipation, motor weakness, insomnia, weight loss, syncope, dizziness, lightheadedness, palpitations, PND, orthopnea. No LE discoloration or ulcers. + b/l  LE edema. Lipid profile is normal. No recent hospitalization. 5-15-18: s/p LHC with mild CAD, valve not able to be crossed but did not try hard to cross. s/p EMMANUEL with severe AS, EF of 35-40%, mild MR, mild AI, mild PHTN. s/p unsuccessful CV . EKG today with afibb at 84bpm. Pt denies chest pain, dyspnea, dyspnea on exertion, change in exercise capacity,  nausea, vomiting, diarrhea, constipation, motor weakness, insomnia, weight loss, syncope, dizziness, lightheadedness, palpitations, PND, orthopnea, or claudication. He feels very tired and sleeps all the time. he has an appointment with  for AV TAVR or surgery. On Eliquis and no bleeding issues. On amiodarone as well.     7-10-18: S/P TAVR AT  WITH [facility]. WAS PLACED ON ASA/PLAVIX FOR ONE MONTH AND HE IS TO RESUME ELIQUIS AFTER. COREG WAS STOPPED AND PLACED ON CARDIZEM. HE IS NOT ON AMIODARONE. Pt denies chest pain, dyspnea, dyspnea on exertion, change in exercise capacity, fatigue,  nausea, vomiting, diarrhea, constipation, motor weakness, insomnia, weight loss, syncope, dizziness, lightheadedness, palpitations, PND, orthopnea, or claudication. NO ISSUES WITH THE PROCEDURE OTHER THAN UTI. No nitro use. BP and hr are good. CAD is stable. No LE discoloration or ulcers. No LE edema. No CHF type symptoms. Lipid profile is normal. ekg TODAY WITH AFIBB AT 90BPM.       8-23-18: HAS BEEN ON NOAC FOR AT LEAST 4 WEEKS. EKG today shows afibb with CVR. Pt denies chest pain, dyspnea, dyspnea on exertion, change in exercise capacity, fatigue,  nausea, vomiting, diarrhea, constipation, motor weakness, insomnia, weight loss, syncope, dizziness, lightheadedness, palpitations, PND, orthopnea, or claudication. No nitro use. BP and hr are good. CAD is stable. No LE discoloration or ulcers. No LE edema. No CHF type symptoms. Lipid profile is normal. No recent hospitalization. No change in meds. Uses a walker. Not able to lose weight. Does not want to have R[place]MOND testing. 9-13-18: s/p hosp for Tikosin loading but did not tolerate, QTC was too long and was having 3-4 sec pauses. Placed on CCB and BBLocker. On NOAC, no bleeding issues. S/p CV into NSR. Was on amiodarone previously. EKG with NSR, LBBB. He does not feel really when he's in afibb. Feeling ok now. Pt denies chest pain, dyspnea, dyspnea on exertion, change in exercise capacity, fatigue,  nausea, vomiting, diarrhea, constipation, motor weakness, insomnia, weight loss, syncope, dizziness, lightheadedness, palpitations, PND, orthopnea, or claudication. 11-20-18: s/p ILR. Healing well.  Pt denies chest pain, dyspnea, dyspnea on exertion, change in exercise capacity, fatigue,  nausea, vomiting, diarrhea, constipation, motor weakness, insomnia, weight loss, syncope, dizziness, lightheadedness, palpitations, PND, orthopnea, or claudication. No nitro use. BP and hr are good. CAD is stable. No LE discoloration or ulcers. No LE edema. No CHF type symptoms. Lipid profile is normal. No recent hospitalization. No change in meds. On NOAC, no bleeding issues. 19: s/p ILR with 13 pause in  and 18 sec pause in  during the day. States he has been sleeping more than usual. Remains on NOAC, no bleeding issues. Pt denies chest pain, dyspnea, dyspnea on exertion, change in exercise capacity, fatigue,  nausea, vomiting, diarrhea, constipation, motor weakness, insomnia, weight loss, syncope, dizziness, lightheadedness, palpitations, PND, orthopnea, or claudication. No nitro use. BP and hr are good. CAD is stable. No LE discoloration or ulcers. No LE edema. No CHF type symptoms. Lipid profile is normal.    3-12-19: s/p PPM dual chamber and ILR removal. S/p ECHO with normal LVF. Needs to have dental work and amoxicillin script. Pt denies chest pain, dyspnea, dyspnea on exertion, change in exercise capacity, fatigue,  nausea, vomiting, diarrhea, constipation, motor weakness, insomnia, weight loss, syncope, dizziness, lightheadedness, palpitations, PND, orthopnea, or claudication. On DOAC. No bleeding issues. Pacer healing well. EKG with NSR.     19: his wife  2 weeks ago. He is having a hard time sleeping. Hx of Pafib, on DOAC, no bleeding issues. Pt denies chest pain, dyspnea, dyspnea on exertion, change in exercise capacity, fatigue,  nausea, vomiting, diarrhea, constipation, motor weakness, insomnia, weight loss, syncope, dizziness, lightheadedness, palpitations, PND, orthopnea, or claudication. No nitro use. BP and hr are good. CAD is stable. No LE discoloration or ulcers. No LE edema.  No CHF type symptoms. Lipid profile is normal. No recent hospitalization. No change in meds. 19: Pt denies chest pain, dyspnea, dyspnea on exertion, change in exercise capacity, fatigue,  nausea, vomiting, diarrhea, constipation, motor weakness, insomnia, weight loss, syncope, dizziness, lightheadedness, palpitations, PND, orthopnea, or claudication. No nitro use. BP and hr are good. CAD is stable. No LE discoloration or ulcers. No LE edema. No CHF type symptoms. Lipid profile is normal. No recent hospitalization. No change in meds. Has lost 40 pounds since his wife . EKG with ? afib or NSR. S/p PPM check in 19 with 0% afib burden. 3-12-20: Pt denies chest pain, dyspnea, dyspnea on exertion, change in exercise capacity, fatigue,  nausea, vomiting, diarrhea, constipation, motor weakness, insomnia, weight loss, syncope, dizziness, lightheadedness, palpitations, PND, orthopnea, or claudication. No nitro use. BP and hr are good. CAD is stable. No LE discoloration or ulcers. No LE edema. No CHF type symptoms. Lipid profile is normal. No recent hospitalization. No change in meds. Hx of pafib. On DOAC. Hx of PPM.   S/p ECHO with  in 2019, patient states it was ok. 10-27-20: S/P RECENT PPM check and was ok. Hx of his aortic valve replacement in 2018. History of cardiac catheterization prior to the showed mild coronary artery disease. Patient with history of paroxysmal atrial fibrillation on oral anticoagulation. History of permanent pacemaker placement. States he is feeling well overall. He denies any recent hospitalizations. No issues with his medications and he is compliant with his medication. BP and HR are ok. Sedentary lifestyle. EKG with NSR today. May 4, 2021: Patient states he is doing well overall. Patient with history of paroxysmal atrial fibrillation he is on oral anticoagulation and history of permanent pacemaker placement. He denies any bleeding issues.   Patient with history of cardiac catheterization with mild CAD. He denies any recent hospitalizations. No symptoms of CHF or angina. Dealing with a left rotator cuff injury. Patient with history of severe aortic stenosis status post TAVR at [facility]. EKG today with normal sinus rhythm, nonspecific interventricular conduction delay/incomplete left bundle branch block    11-23-21: Patient is doing well overall. No recent hospitalizations. Patient with history of paroxysmal atrial fibrillation and is on oral anticoagulation. History of sick sinus syndrome status post permanent pacemaker placement. Most recent pacemaker check with no significant A. fib burden. Patient with history of severe aortic stenosis status post TAVR. EKG today shows likely normal sinus rhythm, baseline artifact and V paced rhythm. Status post echocardiogram in May 13, 2021 showing ejection fraction of 60%, normal functioning TAVR with a mean gradient of 11 mmHg. 5-31-22:  Patient with history of paroxysmal atrial fibrillation and is on oral anticoagulation. No bleeding issues. History of sick sinus syndrome status post permanent pacemaker placement. Most recent pacemaker check with no significant A. fib burden. Patient with history of severe aortic stenosis status post TAVR. No angina or CHF type symptoms. EKG with NSR, LBBB. Patient Active Problem List   Diagnosis    Colon polyps    Seasonal allergies    Essential hypertension    Morbid obesity due to excess calories ([address]    Irritable bowel syndrome without diarrhea    Anemia    Mixed hyperlipidemia    Cardiac murmur    Primary osteoarthritis involving multiple joints    S/P TAVR (transcatheter aortic valve replacement)    History of aortic stenosis    Atrial fibrillation and flutter (HCC)    Nonrheumatic aortic (valve) stenosis    Pacemaker- dual chamber MRI compatible. Past Surgical History:   Procedure Laterality Date    AORTIC VALVE SURGERY  [id number]     BERENICE    CHOLECYSTECTOMY  03/2010    COLONOSCOPY  2009    INSERTABLE CARDIAC MONITOR  09/27/2018    DR. Singh  2018    KNEE SURGERY  1992    PACEMAKER INSERTION  02/19/2019    DR. PARKER       Social History     Socioeconomic History    Marital status:      Spouse name: None    Number of children: None    Years of education: None    Highest education level: None   Occupational History    None   Tobacco Use    Smoking status: Never Smoker    Smokeless tobacco: Never Used   Substance and Sexual Activity    Alcohol use: No    Drug use: No    Sexual activity: None   Other Topics Concern    None   Social History Narrative    None     Social Determinants of Health     Financial Resource Strain:     Difficulty of Paying Living Expenses: Not on file   Food Insecurity:     Worried About Running Out of Food in the Last Year: Not on file    Faheem of Food in the Last Year: Not on file   Transportation Needs:     Lack of Transportation (Medical): Not on file    Lack of Transportation (Non-Medical):  Not on file   Physical Activity:     Days of Exercise per Week: Not on file    Minutes of Exercise per Session: Not on file   Stress:     Feeling of Stress : Not on file   Social Connections:     Frequency of Communication with Friends and Family: Not on file    Frequency of Social Gatherings with Friends and Family: Not on file    Attends [employer] Services: Not on file    Active Member of Clubs or Organizations: Not on file    Attends Club or Organization Meetings: Not on file    Marital Status: Not on file   Intimate Partner Violence:     Fear of Current or Ex-Partner: Not on file    Emotionally Abused: Not on file    Physically Abused: Not on file    Sexually Abused: Not on file   Housing Stability:     Unable to Pay for Housing in the Last Year: Not on file    Number of [place] in the Last Year: Not on file    Unstable Housing in the Last [FreeTextEntry2] : as per HPI  Year: Not on file       Family History   Problem Relation Age of Onset    Other Mother         BRAIN TUMOR       Current Outpatient Medications   Medication Sig Dispense Refill    traMADol (ULTRAM) 50 MG tablet       furosemide (LASIX) 40 MG tablet TAKE 1 TABLET DAILY 90 tablet 3    metoprolol tartrate (LOPRESSOR) 50 MG tablet TAKE 1 TABLET TWICE A  tablet 3    CARTIA  MG extended release capsule TAKE 1 CAPSULE DAILY 90 capsule 3    ELIQUIS 5 MG TABS tablet TAKE 1 TABLET TWICE A  tablet 3    terazosin (HYTRIN) 10 MG capsule Take 1 capsule by mouth daily 90 capsule 3    irbesartan (AVAPRO) 150 MG tablet TAKE 1 TABLET DAILY 30 tablet 0    cyclobenzaprine (FLEXERIL) 10 MG tablet TAKE 1 TABLET EVERY 8 HOURS AS NEEDED FOR MUSCLE SPASMS 180 tablet 1    irbesartan (AVAPRO) 150 MG tablet       Blood Pressure Monitor MISC 1 kit by Does not apply route 2 times daily 1 each 0    metroNIDAZOLE (METROGEL) 0.75 % gel       triamcinolone (KENALOG) 0.1 % cream Apply topically 2 times daily. 45 g 1     Current Facility-Administered Medications   Medication Dose Route Frequency Provider Last Rate Last Admin    cyanocobalamin injection 1,000 mcg  1,000 mcg IntraMUSCular Once Naima Herman MD           Adhesive tape    Review of Systems:  General ROS: positive for  - fatigue  Psychological ROS: negative  Hematological and Lymphatic ROS: No history of blood clots or bleeding disorder. Respiratory ROS: positive for - cough and shortness of breath  Cardiovascular ROS: positive for - dyspnea on exertion, orthopnea and shortness of breath  Gastrointestinal ROS: no abdominal pain, change in bowel habits, or black or bloody stools  Genito-Urinary ROS: no dysuria, trouble voiding, or hematuria  Musculoskeletal ROS: negative  Neurological ROS: no TIA or stroke symptoms  Dermatological ROS: negative    VITALS:  Blood pressure 128/86, pulse 84, resp.  rate 16, height 5' 8\" (1.727 m), weight (!) 368 lb (166.9 kg), SpO2 96 %. Body mass index is 55.95 kg/m². Physical Examination:  General appearance - alert, well appearing, and in no distress and overweight  Mental status - alert, oriented to person, place, and time  Neck - Neck is supple, no JVD or carotid bruits. No thyromegaly or adenopathy. Chest - clear to auscultation, no wheezes, rales or rhonchi, symmetric air entry  Heart - irregularly irregular rhythm with rate 100. + murmur, 3/6 systolic right sb. Abdomen - soft, nontender, nondistended, no masses or organomegaly  Neurological - alert, oriented, normal speech, no focal findings or movement disorder noted  Extremities - peripheral pulses normal, no pedal edema, no clubbing or cyanosis  Skin - normal coloration and turgor, no rashes, no suspicious skin lesions noted  Pacer site looks good. EKG: NSR. Orders Placed This Encounter   Procedures    EKG 12 lead       ASSESSMENT:     Diagnosis Orders   1. Essential hypertension  EKG 12 lead   2. Persistent atrial fibrillation (HCC)  EKG 12 lead   3. Mixed hyperlipidemia     4. Morbid obesity due to excess calories ([address]     5. Pacemaker- dual chamber MRI compatible. 6. S/P TAVR (transcatheter aortic valve replacement)           PLAN:     Patient will need to continue to follow up with you for their general medical care     As always, aggressive risk factor modification is strongly recommended. We should adhere to the JNC VIII guidelines for HTN management and the NCEP ATP III guidelines for LDL-C management. Cardiac diet is always recommended with low fat, cholesterol, calories and sodium. Continue medications at current doses. Does not want to have R[place] testing. Consider amiodarone or sotalol in future post PPM. Will await next PPM check to see if he has afib burden. Follow up with pacer clinic.      Check EKG    Check ECHO given hx of TAVR in future    Multiple signs and symptoms suggestive of sleep apnea, will consider sleep study after cardiac evaluation is completed    Patient was advised and encouraged to check blood pressure at home or at a pharmacy, maintain a logbook, and also call us back if blood pressure are above the target ranges or if it is low. Patient clearly understands and agrees to the instructions. We will need to continue to monitor muscle and liver enzymes, BUN, CR, and electrolytes. Details of medical condition explained and patient was warned about adverse consequences of uncontrolled medical conditions and possible side effects of prescribed medications.

## 2023-05-23 ENCOUNTER — OFFICE VISIT (OUTPATIENT)
Dept: CARDIOLOGY CLINIC | Age: 73
End: 2023-05-23
Payer: MEDICARE

## 2023-05-23 VITALS — SYSTOLIC BLOOD PRESSURE: 150 MMHG | HEART RATE: 88 BPM | DIASTOLIC BLOOD PRESSURE: 80 MMHG

## 2023-05-23 DIAGNOSIS — Z95.0 PACEMAKER: ICD-10-CM

## 2023-05-23 DIAGNOSIS — Z95.2 S/P TAVR (TRANSCATHETER AORTIC VALVE REPLACEMENT): ICD-10-CM

## 2023-05-23 DIAGNOSIS — I48.92 ATRIAL FIBRILLATION AND FLUTTER (HCC): ICD-10-CM

## 2023-05-23 DIAGNOSIS — E66.01 MORBID OBESITY DUE TO EXCESS CALORIES (HCC): ICD-10-CM

## 2023-05-23 DIAGNOSIS — R01.1 CARDIAC MURMUR: ICD-10-CM

## 2023-05-23 DIAGNOSIS — I10 ESSENTIAL HYPERTENSION: ICD-10-CM

## 2023-05-23 DIAGNOSIS — I48.91 ATRIAL FIBRILLATION AND FLUTTER (HCC): ICD-10-CM

## 2023-05-23 DIAGNOSIS — I48.19 PERSISTENT ATRIAL FIBRILLATION (HCC): Primary | ICD-10-CM

## 2023-05-23 DIAGNOSIS — E78.2 MIXED HYPERLIPIDEMIA: ICD-10-CM

## 2023-05-23 PROCEDURE — 99214 OFFICE O/P EST MOD 30 MIN: CPT | Performed by: INTERNAL MEDICINE

## 2023-05-23 PROCEDURE — G8427 DOCREV CUR MEDS BY ELIG CLIN: HCPCS | Performed by: INTERNAL MEDICINE

## 2023-05-23 PROCEDURE — 1123F ACP DISCUSS/DSCN MKR DOCD: CPT | Performed by: INTERNAL MEDICINE

## 2023-05-23 PROCEDURE — 1036F TOBACCO NON-USER: CPT | Performed by: INTERNAL MEDICINE

## 2023-05-23 PROCEDURE — 3079F DIAST BP 80-89 MM HG: CPT | Performed by: INTERNAL MEDICINE

## 2023-05-23 PROCEDURE — 3017F COLORECTAL CA SCREEN DOC REV: CPT | Performed by: INTERNAL MEDICINE

## 2023-05-23 PROCEDURE — 3077F SYST BP >= 140 MM HG: CPT | Performed by: INTERNAL MEDICINE

## 2023-05-23 PROCEDURE — G8417 CALC BMI ABV UP PARAM F/U: HCPCS | Performed by: INTERNAL MEDICINE

## 2023-05-23 PROCEDURE — 93000 ELECTROCARDIOGRAM COMPLETE: CPT | Performed by: INTERNAL MEDICINE

## 2023-05-23 NOTE — PROGRESS NOTES
Chief Complaint   Patient presents with    Atrial Fibrillation       4-26-18: Patient presents for initial medical evaluation. Patient is followed on a regular basis by Dr. Debi Valerio MD. Has been dealing with URI/bronchitis for the past week or so. Feeling tired, SOB. EKG today shows afibb at 111bpm. Does have orthopnea as well. No previous hx of afibb. No hx of MI, CHF or arrhythmias. No hx of LHC. Last stress test was 10 yrs ago. Pt denies chest pain,   nausea, vomiting, diarrhea, constipation, motor weakness, insomnia, weight loss, syncope, dizziness, lightheadedness, palpitations, PND, orthopnea, or claudication. + cough and nasal congestion. 5-3-18: feeling better overall, still SOB with mild exertion but much improved from last office visit. . s/p CXR with cardiomegaly and right pleural effusion. s/p ECHO with EF of 45%, mod LAE, Severe AS with Vmax of 4.3m/s, Peak of 74mmHg, mean of 38mmHg, AMARILYS 0.84cm2, RVSP of 35mmHg. BNP was 7000, lipid profile is ok, trop was negative. Renal function is normal. Diuresed well with lasix. HR and BP are controlled now. EKG with Afibb at 85bpm today. Pt denies chest pain,   nausea, vomiting, diarrhea, constipation, motor weakness, insomnia, weight loss, syncope, dizziness, lightheadedness, palpitations, PND, orthopnea. No LE discoloration or ulcers. + b/l  LE edema. Lipid profile is normal. No recent hospitalization. 5-15-18: s/p LHC with mild CAD, valve not able to be crossed but did not try hard to cross. s/p EMMANUEL with severe AS, EF of 35-40%, mild MR, mild AI, mild PHTN. s/p unsuccessful CV . EKG today with afibb at 84bpm. Pt denies chest pain, dyspnea, dyspnea on exertion, change in exercise capacity,  nausea, vomiting, diarrhea, constipation, motor weakness, insomnia, weight loss, syncope, dizziness, lightheadedness, palpitations, PND, orthopnea, or claudication. He feels very tired and sleeps all the time. he has an appointment with [address] [place] [address] for AV TAVR or

## 2023-07-27 ENCOUNTER — TELEPHONE (OUTPATIENT)
Dept: PRIMARY CARE | Facility: CLINIC | Age: 73
End: 2023-07-27
Payer: MEDICARE

## 2023-07-27 NOTE — TELEPHONE ENCOUNTER
PATIENT HAD BILLING ISSUE FROM DATE OF SERVICE 4-11-23.  VISIT WAS CODED 16438 AND .  TOTAL WAS $507.53, PAYMENT OF $58.10, ADJUSTMENT OF $122.86 WITH A BALANCE DUE BY PATIENT OF $326.57.      PATIENT STATES HE HAS NEVER OWED THIS MUCH FOR A VISIT.  I DID CALL BILLING TO LOOK INTO THIS AND REBILL INSURANCE.   BILLING SAID IT LOOKED LIKE LAB BILLING AND OFFICE BILLING.  WE WILL WAIT TO SEE WHAT INSURANCE SAYS.

## 2023-10-10 ENCOUNTER — OFFICE VISIT (OUTPATIENT)
Dept: PRIMARY CARE | Facility: CLINIC | Age: 73
End: 2023-10-10
Payer: MEDICARE

## 2023-10-10 VITALS
DIASTOLIC BLOOD PRESSURE: 62 MMHG | SYSTOLIC BLOOD PRESSURE: 118 MMHG | TEMPERATURE: 97.2 F | BODY MASS INDEX: 56.26 KG/M2 | OXYGEN SATURATION: 98 % | HEART RATE: 92 BPM | WEIGHT: 315 LBS

## 2023-10-10 DIAGNOSIS — I48.91 ATRIAL FIBRILLATION AND FLUTTER (MULTI): ICD-10-CM

## 2023-10-10 DIAGNOSIS — I50.42 CHRONIC COMBINED SYSTOLIC AND DIASTOLIC CONGESTIVE HEART FAILURE (MULTI): Primary | ICD-10-CM

## 2023-10-10 DIAGNOSIS — E78.2 MIXED HYPERLIPIDEMIA: ICD-10-CM

## 2023-10-10 DIAGNOSIS — M15.9 PRIMARY OSTEOARTHRITIS INVOLVING MULTIPLE JOINTS: ICD-10-CM

## 2023-10-10 DIAGNOSIS — I48.92 ATRIAL FIBRILLATION AND FLUTTER (MULTI): ICD-10-CM

## 2023-10-10 DIAGNOSIS — I10 ESSENTIAL HYPERTENSION: ICD-10-CM

## 2023-10-10 DIAGNOSIS — E66.01 CLASS 3 SEVERE OBESITY DUE TO EXCESS CALORIES WITH SERIOUS COMORBIDITY AND BODY MASS INDEX (BMI) OF 50.0 TO 59.9 IN ADULT (MULTI): ICD-10-CM

## 2023-10-10 PROCEDURE — 1036F TOBACCO NON-USER: CPT | Performed by: FAMILY MEDICINE

## 2023-10-10 PROCEDURE — 3074F SYST BP LT 130 MM HG: CPT | Performed by: FAMILY MEDICINE

## 2023-10-10 PROCEDURE — 1159F MED LIST DOCD IN RCRD: CPT | Performed by: FAMILY MEDICINE

## 2023-10-10 PROCEDURE — G0008 ADMIN INFLUENZA VIRUS VAC: HCPCS | Performed by: FAMILY MEDICINE

## 2023-10-10 PROCEDURE — 99214 OFFICE O/P EST MOD 30 MIN: CPT | Performed by: FAMILY MEDICINE

## 2023-10-10 PROCEDURE — 90662 IIV NO PRSV INCREASED AG IM: CPT | Performed by: FAMILY MEDICINE

## 2023-10-10 PROCEDURE — 3008F BODY MASS INDEX DOCD: CPT | Performed by: FAMILY MEDICINE

## 2023-10-10 PROCEDURE — 1160F RVW MEDS BY RX/DR IN RCRD: CPT | Performed by: FAMILY MEDICINE

## 2023-10-10 PROCEDURE — 3078F DIAST BP <80 MM HG: CPT | Performed by: FAMILY MEDICINE

## 2023-10-10 ASSESSMENT — ENCOUNTER SYMPTOMS
COUGH: 0
HEADACHES: 0
ARTHRALGIAS: 0
NERVOUS/ANXIOUS: 0
NUMBNESS: 0
ACTIVITY CHANGE: 0
BRUISES/BLEEDS EASILY: 0
DIARRHEA: 0
SORE THROAT: 0
DIFFICULTY URINATING: 0
DIZZINESS: 0
MYALGIAS: 0
ADENOPATHY: 0
EYE DISCHARGE: 0
WEAKNESS: 0
CHEST TIGHTNESS: 0
FATIGUE: 0
VOMITING: 0
BACK PAIN: 0
DYSPHORIC MOOD: 0
ABDOMINAL PAIN: 0
NAUSEA: 0
CONSTIPATION: 0
NECK PAIN: 0
HEMATURIA: 0
SHORTNESS OF BREATH: 0
BLOOD IN STOOL: 0

## 2023-10-10 NOTE — PROGRESS NOTES
Subjective   Patient ID: Mag Pierce is a 73 y.o. male who presents for Depression and Obesity.  HPI    CHF and A-fib stable  Keep cardiac follow-up    Hypertension nicely controlled  No chest pain or shortness of breath  Tolerates medicine well    High cholesterol stable watch diet    Obese  Recommend weight loss  Has lost some weight    Osteoarthritis ongoing  Multiple joints  Patient walks with a walker  Review of Systems   Constitutional:  Negative for activity change and fatigue.   HENT:  Negative for congestion and sore throat.    Eyes:  Negative for discharge.   Respiratory:  Negative for cough, chest tightness and shortness of breath.    Cardiovascular:  Negative for chest pain and leg swelling.   Gastrointestinal:  Negative for abdominal pain, blood in stool, constipation, diarrhea, nausea and vomiting.   Endocrine: Negative for cold intolerance and heat intolerance.   Genitourinary:  Negative for difficulty urinating and hematuria.   Musculoskeletal:  Negative for arthralgias, back pain, gait problem, myalgias and neck pain.   Allergic/Immunologic: Negative for environmental allergies.   Neurological:  Negative for dizziness, syncope, weakness, numbness and headaches.   Hematological:  Negative for adenopathy. Does not bruise/bleed easily.   Psychiatric/Behavioral:  Negative for dysphoric mood. The patient is not nervous/anxious.    All other systems reviewed and are negative.      Objective   /62 (BP Location: Left arm)   Pulse 92   Temp 36.2 °C (97.2 °F) (Temporal)   Wt (!) 168 kg (370 lb)   SpO2 98%   BMI 56.26 kg/m²    Physical Exam  Vitals and nursing note reviewed.   Constitutional:       General: He is not in acute distress.     Appearance: Normal appearance. He is obese.   HENT:      Head: Normocephalic and atraumatic.      Right Ear: Tympanic membrane, ear canal and external ear normal.      Left Ear: Tympanic membrane, ear canal and external ear normal.      Nose: Nose normal.       Mouth/Throat:      Mouth: Mucous membranes are moist.      Pharynx: Oropharynx is clear. No oropharyngeal exudate or posterior oropharyngeal erythema.   Eyes:      Extraocular Movements: Extraocular movements intact.      Conjunctiva/sclera: Conjunctivae normal.      Pupils: Pupils are equal, round, and reactive to light.   Cardiovascular:      Rate and Rhythm: Normal rate and regular rhythm.      Pulses: Normal pulses.      Heart sounds: Normal heart sounds. No murmur heard.  Pulmonary:      Effort: Pulmonary effort is normal. No respiratory distress.      Breath sounds: Normal breath sounds. No wheezing or rales.   Abdominal:      General: Abdomen is flat. Bowel sounds are normal. There is no distension.      Palpations: Abdomen is soft. There is no mass.      Tenderness: There is no abdominal tenderness.   Musculoskeletal:         General: No swelling or deformity. Normal range of motion.      Cervical back: Normal range of motion and neck supple.      Right lower leg: No edema.      Left lower leg: No edema.   Lymphadenopathy:      Cervical: No cervical adenopathy.   Skin:     General: Skin is warm and dry.      Capillary Refill: Capillary refill takes less than 2 seconds.      Findings: No lesion or rash.   Neurological:      General: No focal deficit present.      Mental Status: He is alert and oriented to person, place, and time.      Cranial Nerves: No cranial nerve deficit.      Motor: No weakness.   Psychiatric:         Mood and Affect: Mood normal.         Behavior: Behavior normal.         Thought Content: Thought content normal.         Judgment: Judgment normal.         Assessment/Plan   Problem List Items Addressed This Visit       Atrial fibrillation and flutter (CMS/HCC)    Essential hypertension    Chronic CHF (CMS/HCC) - Primary    Relevant Orders    Follow Up In [facility] - PCP    Mixed hyperlipidemia    Primary osteoarthritis involving multiple joints    Class 3 severe obesity due to  excess calories with serious comorbidity and body mass index (BMI) of 50.0 to 59.9 in adult (CMS/HCC)       Patient education provided.  Stay current with age appropriate health maintenance as instructed.  Appointment here or ER with new or worsening symptoms'  Keep appropriate follow-up visit.  Stay current with proper immunizations   Weight loss and diet  Recheck 6 months  Stay current with health maintenance  Discussed at length with patient

## 2023-11-17 ENCOUNTER — HOSPITAL ENCOUNTER (OUTPATIENT)
Dept: CARDIOLOGY | Facility: HOSPITAL | Age: 73
Discharge: HOME | End: 2023-11-17
Payer: MEDICARE

## 2023-11-17 DIAGNOSIS — I48.0 PAROXYSMAL ATRIAL FIBRILLATION (MULTI): ICD-10-CM

## 2023-11-17 DIAGNOSIS — Z95.0 PACEMAKER: ICD-10-CM

## 2023-11-17 DIAGNOSIS — Z95.0 PACEMAKER: Primary | ICD-10-CM

## 2023-11-17 PROCEDURE — 93296 REM INTERROG EVL PM/IDS: CPT

## 2023-11-23 DIAGNOSIS — I10 ESSENTIAL HYPERTENSION: Primary | ICD-10-CM

## 2023-11-23 DIAGNOSIS — I48.91 ATRIAL FIBRILLATION AND FLUTTER (HCC): ICD-10-CM

## 2023-11-23 DIAGNOSIS — I48.92 ATRIAL FIBRILLATION AND FLUTTER (HCC): ICD-10-CM

## 2023-11-23 DIAGNOSIS — I10 ESSENTIAL HYPERTENSION: ICD-10-CM

## 2023-11-24 RX ORDER — METOPROLOL TARTRATE 50 MG/1
TABLET, FILM COATED ORAL
Qty: 180 TABLET | Refills: 3 | Status: SHIPPED | OUTPATIENT
Start: 2023-11-24

## 2023-11-24 NOTE — TELEPHONE ENCOUNTER
Recent Visits  Date Type Provider Dept   10/10/23 Office Visit David Varghese MD Do [id number]   04/11/23 Office Visit David Varghese MD Do [id number]   Showing recent visits within past 540 days and meeting all other requirements  Future Appointments  Date Type Provider Dept   04/09/24 Appointment David Varghese MD Do [id number]   Showing future appointments within next 180 days and meeting all other requirements

## 2023-11-24 NOTE — TELEPHONE ENCOUNTER
Requesting medication refill.  Please approve or deny this request.    Rx requested:  Requested Prescriptions     Pending Prescriptions Disp Refills    metoprolol tartrate (LOPRESSOR) 50 MG tablet [Pharmacy Med Name: Metoprolol Tartrate 50 MG Oral Tablet] 180 tablet 3     Sig: TAKE 1 TABLET BY MOUTH TWICE  DAILY         Last Office Visit:   5/23/2023      Next Visit Date:  Future Appointments   Date Time Provider [address] [place] [address]   5/28/2024  8:00 AM Holiday, Gissel Valera DO [address] [facility]

## 2023-11-27 ASSESSMENT — ENCOUNTER SYMPTOMS
NECK PAIN: 0
SWOLLEN GLANDS: 1
DIARRHEA: 0
HOARSE VOICE: 1
ABDOMINAL PAIN: 0
SORE THROAT: 1
VOMITING: 0
STRIDOR: 1
HEADACHES: 1
COUGH: 1
TROUBLE SWALLOWING: 0

## 2023-11-28 NOTE — PROGRESS NOTES
Subjective   Patient ID: Mag Pierce is a 73 y.o. male who presents for No chief complaint on file..  HPI    Review of Systems    Objective   There were no vitals taken for this visit.   Physical Exam    Assessment/Plan   Problem List Items Addressed This Visit    None      Patient education provided.  Stay current with age appropriate health maintenance as instructed.  Appointment here or ER with new or worsening symptoms'  Keep appropriate follow-up visit.  Stay current with proper immunizations

## 2023-11-30 ENCOUNTER — APPOINTMENT (OUTPATIENT)
Dept: PRIMARY CARE | Facility: CLINIC | Age: 73
End: 2023-11-30
Payer: MEDICARE

## 2023-11-30 RX ORDER — TERAZOSIN 10 MG/1
10 CAPSULE ORAL NIGHTLY
Qty: 90 CAPSULE | Refills: 1 | Status: SHIPPED | OUTPATIENT
Start: 2023-11-30 | End: 2024-04-15

## 2023-12-05 PROCEDURE — 93294 REM INTERROG EVL PM/LDLS PM: CPT | Performed by: INTERNAL MEDICINE

## 2024-02-05 DIAGNOSIS — Z79.2 PROPHYLACTIC ANTIBIOTIC: ICD-10-CM

## 2024-02-05 RX ORDER — AMOXICILLIN 500 MG/1
CAPSULE ORAL
Qty: 6 CAPSULE | Refills: 0 | Status: SHIPPED | OUTPATIENT
Start: 2024-02-05 | End: 2024-02-06 | Stop reason: SDUPTHER

## 2024-02-05 NOTE — TELEPHONE ENCOUNTER
Mag needs a script for amoxicillin sent to his pharmacy he says he takes 400-500mg before his dentist appointments and needs that sent to:    [facility] [address]

## 2024-02-06 DIAGNOSIS — Z79.2 PROPHYLACTIC ANTIBIOTIC: ICD-10-CM

## 2024-02-06 RX ORDER — AMOXICILLIN 500 MG/1
CAPSULE ORAL
Qty: 4 CAPSULE | Refills: 0 | Status: SHIPPED | OUTPATIENT
Start: 2024-02-06 | End: 2024-04-11 | Stop reason: SDUPTHER

## 2024-02-06 NOTE — TELEPHONE ENCOUNTER
"KENAN CALLED IN TO REQUEST NK RESEND HIS RX FOR THE AMOXICILLIN DUE TO THE INSTRUCTIONS BEING WRITTEN INCORRECTLY. STATED THAT THE PHARMACY TOLD HIM IT IS SUPPOSED TO BE FOR \" 16PILLS 500MG, 4 CAPSULES ONE HOUR PRIOR\" PLEASE ADVISE    GE IN [place]  "

## 2024-02-22 ENCOUNTER — HOSPITAL ENCOUNTER (OUTPATIENT)
Dept: CARDIOLOGY | Facility: HOSPITAL | Age: 74
Discharge: HOME | End: 2024-02-22
Payer: MEDICARE

## 2024-02-22 DIAGNOSIS — I48.0 PAROXYSMAL ATRIAL FIBRILLATION (MULTI): ICD-10-CM

## 2024-02-22 DIAGNOSIS — Z95.0 PACEMAKER: ICD-10-CM

## 2024-02-22 PROCEDURE — 93296 REM INTERROG EVL PM/IDS: CPT

## 2024-02-27 NOTE — TELEPHONE ENCOUNTER
"Follow up with Murphy 3/6/2024 at 1115  Follow up with [facility]    Please bring in Blood sugar  journal as well      Please start taking Blood pressure and writing it down on journal provided     Checking Blood Pressure:  Home blood pressure monitoring:  - check your blood pressure every day and put it in a log  - pick a different time each day so we can see how it varies throughout the day  - when you check your blood pressure: make sure you sit quietly for 5-10 minutes beforehand, rest arm on table near height of your heart, legs uncrossed, keep both feet flat on the ground, with back support, and make sure you use an arm cuff at heart height      -Blood pressure cuff, spend in the $40-65, with good return policy    -It should be automatic, upper arm, measure your arm to get the correct size, probably adult Large but your arm should be under 16.5 cm. If you need an XL cuff, you will have to have it special ordered from a pharmacy or durable medical equipment company.    -Can bring your cuff to at least one appointment where it can be calibrated to a manual cuff if you are concerned.    -Goal blood pressure is at least under 130/80     If you think your BP is overall too high, let us know in the office, we can adjust medications, can use MyChart.    Lifestyle factors to help manage blood pressure:  1) reduce stress with daily activity, consider yoga, breathing exercises (like 4-7-8 breathing technique)   2) reduce sodium to <2000 mg/day. Get salt smart, start looking at labels, count it up.  Salt is hidden in everything, salad dressing, sauces, cheese, most canned food, any processed meat  3) DASH diet     4) 200-300 min/week cardio, which you can build up to.       Please check out \"life's simple 7\" at https://www.heart.org/en/professional/workplace-health/lifes-simple-7               " Patient had COVID 12/20/21. Currently his is experiencing dizziness and acid reflux feeling in the stomach. No rashes or fevers or other symptoms. He and his daughter had looked up post COVID issues/symptoms and saw that his heart could be affect or could get something called Post Covid MIS (Multisystem inflammatory syndrome). He would like to know if you have any advise or suggestions. Especially regarding the dizziness. Notes he does not check his blood pressure or have a machine any longer.

## 2024-03-26 NOTE — PROGRESS NOTES
"  Subjective   Reason for Visit: Mag Pierce is an 74 y.o. y.o. male here for a Medicare Wellness visit.       HPI    Patient Care Team:  David Varghese MD as PCP - General  David Varghese MD as PCP - [facility] PCP     Review of Systems    Objective   Vitals:  /79 (BP Location: Left arm, BP Cuff Size: Large adult)   Pulse 87   Ht 1.727 m (5' 8\")   Wt (!) 170 kg (375 lb)   SpO2 97%   BMI 57.02 kg/m²       Physical Exam    Assessment/Plan   Problem List Items Addressed This Visit    None  Patient education provided.  Stay current with age appropriate health maintenance as instructed.  Appointment here or ER with new or worsening symptoms'  Keep appropriate follow-up visit.  Stay current with proper immunizations   "

## 2024-04-09 ENCOUNTER — APPOINTMENT (OUTPATIENT)
Dept: PRIMARY CARE | Facility: CLINIC | Age: 74
End: 2024-04-09
Payer: MEDICARE

## 2024-04-09 ASSESSMENT — ENCOUNTER SYMPTOMS
ORTHOPNEA: 0
SWEATS: 0
NECK PAIN: 0
PALPITATIONS: 0
PND: 0
HEADACHES: 0
BLURRED VISION: 0
HYPERTENSION: 1
SHORTNESS OF BREATH: 0

## 2024-04-11 ENCOUNTER — TELEPHONE (OUTPATIENT)
Dept: PRIMARY CARE | Facility: CLINIC | Age: 74
End: 2024-04-11

## 2024-04-11 ENCOUNTER — OFFICE VISIT (OUTPATIENT)
Dept: PRIMARY CARE | Facility: CLINIC | Age: 74
End: 2024-04-11
Payer: MEDICARE

## 2024-04-11 ENCOUNTER — LAB (OUTPATIENT)
Dept: LAB | Facility: LAB | Age: 74
End: 2024-04-11
Payer: MEDICARE

## 2024-04-11 VITALS
DIASTOLIC BLOOD PRESSURE: 79 MMHG | BODY MASS INDEX: 47.74 KG/M2 | WEIGHT: 315 LBS | OXYGEN SATURATION: 97 % | SYSTOLIC BLOOD PRESSURE: 138 MMHG | HEART RATE: 87 BPM | HEIGHT: 68 IN

## 2024-04-11 DIAGNOSIS — Z79.2 PROPHYLACTIC ANTIBIOTIC: ICD-10-CM

## 2024-04-11 DIAGNOSIS — Z00.00 ROUTINE GENERAL MEDICAL EXAMINATION AT HEALTH CARE FACILITY: Primary | ICD-10-CM

## 2024-04-11 DIAGNOSIS — I50.42 CHRONIC COMBINED SYSTOLIC AND DIASTOLIC CONGESTIVE HEART FAILURE (MULTI): ICD-10-CM

## 2024-04-11 DIAGNOSIS — E78.2 MIXED HYPERLIPIDEMIA: ICD-10-CM

## 2024-04-11 DIAGNOSIS — Z12.5 PROSTATE CANCER SCREENING: ICD-10-CM

## 2024-04-11 DIAGNOSIS — I48.91 ATRIAL FIBRILLATION AND FLUTTER (MULTI): ICD-10-CM

## 2024-04-11 DIAGNOSIS — I48.92 ATRIAL FIBRILLATION AND FLUTTER (MULTI): ICD-10-CM

## 2024-04-11 DIAGNOSIS — M15.9 PRIMARY OSTEOARTHRITIS INVOLVING MULTIPLE JOINTS: ICD-10-CM

## 2024-04-11 DIAGNOSIS — I10 ESSENTIAL HYPERTENSION: ICD-10-CM

## 2024-04-11 DIAGNOSIS — E66.01 CLASS 3 SEVERE OBESITY DUE TO EXCESS CALORIES WITH SERIOUS COMORBIDITY AND BODY MASS INDEX (BMI) OF 50.0 TO 59.9 IN ADULT (MULTI): ICD-10-CM

## 2024-04-11 LAB
ALBUMIN SERPL BCP-MCNC: 4.2 G/DL (ref 3.4–5)
ALP SERPL-CCNC: 59 U/L (ref 33–136)
ALT SERPL W P-5'-P-CCNC: 20 U/L (ref 10–52)
ANION GAP SERPL CALC-SCNC: 12 MMOL/L (ref 10–20)
AST SERPL W P-5'-P-CCNC: 17 U/L (ref 9–39)
BASOPHILS # BLD AUTO: 0.04 X10*3/UL (ref 0–0.1)
BASOPHILS NFR BLD AUTO: 0.6 %
BILIRUB SERPL-MCNC: 0.7 MG/DL (ref 0–1.2)
BUN SERPL-MCNC: 19 MG/DL (ref 6–23)
CALCIUM SERPL-MCNC: 9.1 MG/DL (ref 8.6–10.3)
CHLORIDE SERPL-SCNC: 103 MMOL/L (ref 98–107)
CHOLEST SERPL-MCNC: 174 MG/DL (ref 0–199)
CHOLESTEROL/HDL RATIO: 4.1
CO2 SERPL-SCNC: 30 MMOL/L (ref 21–32)
CREAT SERPL-MCNC: 0.85 MG/DL (ref 0.5–1.3)
EGFRCR SERPLBLD CKD-EPI 2021: >90 ML/MIN/1.73M*2
EOSINOPHIL # BLD AUTO: 0.28 X10*3/UL (ref 0–0.4)
EOSINOPHIL NFR BLD AUTO: 4.3 %
ERYTHROCYTE [DISTWIDTH] IN BLOOD BY AUTOMATED COUNT: 13.7 % (ref 11.5–14.5)
GLUCOSE SERPL-MCNC: 108 MG/DL (ref 74–99)
HCT VFR BLD AUTO: 40.9 % (ref 41–52)
HDLC SERPL-MCNC: 42.9 MG/DL
HGB BLD-MCNC: 12.8 G/DL (ref 13.5–17.5)
IMM GRANULOCYTES # BLD AUTO: 0.01 X10*3/UL (ref 0–0.5)
IMM GRANULOCYTES NFR BLD AUTO: 0.2 % (ref 0–0.9)
LDLC SERPL CALC-MCNC: 111 MG/DL
LYMPHOCYTES # BLD AUTO: 1.16 X10*3/UL (ref 0.8–3)
LYMPHOCYTES NFR BLD AUTO: 18 %
MCH RBC QN AUTO: 34 PG (ref 26–34)
MCHC RBC AUTO-ENTMCNC: 31.3 G/DL (ref 32–36)
MCV RBC AUTO: 109 FL (ref 80–100)
MONOCYTES # BLD AUTO: 0.58 X10*3/UL (ref 0.05–0.8)
MONOCYTES NFR BLD AUTO: 9 %
NEUTROPHILS # BLD AUTO: 4.38 X10*3/UL (ref 1.6–5.5)
NEUTROPHILS NFR BLD AUTO: 67.9 %
NON HDL CHOLESTEROL: 131 MG/DL (ref 0–149)
NRBC BLD-RTO: 0 /100 WBCS (ref 0–0)
PLATELET # BLD AUTO: 226 X10*3/UL (ref 150–450)
POTASSIUM SERPL-SCNC: 3.9 MMOL/L (ref 3.5–5.3)
PROT SERPL-MCNC: 7.1 G/DL (ref 6.4–8.2)
PSA SERPL-MCNC: 4.88 NG/ML
RBC # BLD AUTO: 3.76 X10*6/UL (ref 4.5–5.9)
SODIUM SERPL-SCNC: 141 MMOL/L (ref 136–145)
TRIGL SERPL-MCNC: 103 MG/DL (ref 0–149)
VLDL: 21 MG/DL (ref 0–40)
WBC # BLD AUTO: 6.5 X10*3/UL (ref 4.4–11.3)

## 2024-04-11 PROCEDURE — 3078F DIAST BP <80 MM HG: CPT | Performed by: FAMILY MEDICINE

## 2024-04-11 PROCEDURE — 1159F MED LIST DOCD IN RCRD: CPT | Performed by: FAMILY MEDICINE

## 2024-04-11 PROCEDURE — 80061 LIPID PANEL: CPT

## 2024-04-11 PROCEDURE — 1160F RVW MEDS BY RX/DR IN RCRD: CPT | Performed by: FAMILY MEDICINE

## 2024-04-11 PROCEDURE — G0439 PPPS, SUBSEQ VISIT: HCPCS | Performed by: FAMILY MEDICINE

## 2024-04-11 PROCEDURE — 1036F TOBACCO NON-USER: CPT | Performed by: FAMILY MEDICINE

## 2024-04-11 PROCEDURE — 85025 COMPLETE CBC W/AUTO DIFF WBC: CPT

## 2024-04-11 PROCEDURE — 36415 COLL VENOUS BLD VENIPUNCTURE: CPT

## 2024-04-11 PROCEDURE — G0103 PSA SCREENING: HCPCS

## 2024-04-11 PROCEDURE — 3075F SYST BP GE 130 - 139MM HG: CPT | Performed by: FAMILY MEDICINE

## 2024-04-11 PROCEDURE — 99214 OFFICE O/P EST MOD 30 MIN: CPT | Performed by: FAMILY MEDICINE

## 2024-04-11 PROCEDURE — 3008F BODY MASS INDEX DOCD: CPT | Performed by: FAMILY MEDICINE

## 2024-04-11 PROCEDURE — 80053 COMPREHEN METABOLIC PANEL: CPT

## 2024-04-11 PROCEDURE — 1170F FXNL STATUS ASSESSED: CPT | Performed by: FAMILY MEDICINE

## 2024-04-11 RX ORDER — AMOXICILLIN 500 MG/1
CAPSULE ORAL
Qty: 4 CAPSULE | Refills: 0 | Status: SHIPPED | OUTPATIENT
Start: 2024-04-11 | End: 2024-04-11

## 2024-04-11 RX ORDER — AMOXICILLIN 500 MG/1
CAPSULE ORAL
Qty: 21 CAPSULE | Refills: 0 | Status: SHIPPED | OUTPATIENT
Start: 2024-04-11

## 2024-04-11 ASSESSMENT — ACTIVITIES OF DAILY LIVING (ADL)
TAKING_MEDICATION: INDEPENDENT
MANAGING_FINANCES: INDEPENDENT
DOING_HOUSEWORK: INDEPENDENT
GROCERY_SHOPPING: INDEPENDENT
BATHING: INDEPENDENT
DRESSING: INDEPENDENT

## 2024-04-11 ASSESSMENT — PATIENT HEALTH QUESTIONNAIRE - PHQ9
1. LITTLE INTEREST OR PLEASURE IN DOING THINGS: NOT AT ALL
SUM OF ALL RESPONSES TO PHQ9 QUESTIONS 1 AND 2: 0
2. FEELING DOWN, DEPRESSED OR HOPELESS: NOT AT ALL

## 2024-04-11 ASSESSMENT — ENCOUNTER SYMPTOMS
SHORTNESS OF BREATH: 0
ORTHOPNEA: 0
NUMBNESS: 0
NAUSEA: 0
DIFFICULTY URINATING: 0
VOMITING: 0
LOSS OF SENSATION IN FEET: 0
WEAKNESS: 0
DYSPHORIC MOOD: 0
EYE DISCHARGE: 0
PND: 0
DIZZINESS: 0
ARTHRALGIAS: 0
BACK PAIN: 0
MYALGIAS: 0
SWEATS: 0
NECK PAIN: 0
CHEST TIGHTNESS: 0
DEPRESSION: 0
HEADACHES: 0
OCCASIONAL FEELINGS OF UNSTEADINESS: 1
CONSTIPATION: 0
FATIGUE: 0
SORE THROAT: 0
BLOOD IN STOOL: 0
ACTIVITY CHANGE: 0
BRUISES/BLEEDS EASILY: 0
HYPERTENSION: 1
COUGH: 0
DIARRHEA: 0
NERVOUS/ANXIOUS: 0
BLURRED VISION: 0
ADENOPATHY: 0
PALPITATIONS: 0
HEMATURIA: 0
ABDOMINAL PAIN: 0

## 2024-04-11 NOTE — PROGRESS NOTES
Subjective   Reason for Visit: Mag Pierce is an 74 y.o. male here for a Medicare Wellness visit.     Past Medical, Surgical, and Family History reviewed and updated in chart.    Reviewed all medications by prescribing practitioner or clinical pharmacist (such as prescriptions, OTCs, herbal therapies and supplements) and documented in the medical record.  Annual Medicare wellness    Also general checkup    CHF and A-fib stable keep cardiac follow-up    Obese recommend weight loss and diet    High cholesterol stable watch diet    Osteoarthritis of multiple joints  He declines more orthopedic evaluation    Patient due for blood work    Declines colorectal cancer screening discussed risks  Hypertension  This is a recurrent problem. The problem is unchanged. Pertinent negatives include no anxiety, blurred vision, chest pain, headaches, malaise/fatigue, neck pain, orthopnea, palpitations, peripheral edema, PND, shortness of breath or sweats. There are no associated agents to hypertension. Risk factors for coronary artery disease include obesity. There are no compliance problems.        Patient Care Team:  David Varghese MD as PCP - General  David Varghese MD as PCP - [facility] Attributed Provider     Review of Systems   Constitutional:  Negative for activity change, fatigue and malaise/fatigue.   HENT:  Negative for congestion and sore throat.    Eyes:  Negative for blurred vision and discharge.   Respiratory:  Negative for cough, chest tightness and shortness of breath.    Cardiovascular:  Negative for chest pain, palpitations, orthopnea, leg swelling and PND.   Gastrointestinal:  Negative for abdominal pain, blood in stool, constipation, diarrhea, nausea and vomiting.   Endocrine: Negative for cold intolerance and heat intolerance.   Genitourinary:  Negative for difficulty urinating and hematuria.   Musculoskeletal:  Negative for arthralgias, back pain, gait problem, myalgias and neck pain.  "  Allergic/Immunologic: Negative for environmental allergies.   Neurological:  Negative for dizziness, syncope, weakness, numbness and headaches.   Hematological:  Negative for adenopathy. Does not bruise/bleed easily.   Psychiatric/Behavioral:  Negative for dysphoric mood. The patient is not nervous/anxious.    All other systems reviewed and are negative.      Objective   Vitals:  /79 (BP Location: Left arm, BP Cuff Size: Large adult)   Pulse 87   Ht 1.727 m (5' 8\")   Wt (!) 170 kg (375 lb)   SpO2 97%   BMI 57.02 kg/m²       Physical Exam  Vitals and nursing note reviewed.   Constitutional:       General: He is not in acute distress.     Appearance: Normal appearance.   HENT:      Head: Normocephalic and atraumatic.      Right Ear: Tympanic membrane, ear canal and external ear normal.      Left Ear: Tympanic membrane, ear canal and external ear normal.      Nose: Nose normal.      Mouth/Throat:      Mouth: Mucous membranes are moist.      Pharynx: Oropharynx is clear. No oropharyngeal exudate or posterior oropharyngeal erythema.   Eyes:      Extraocular Movements: Extraocular movements intact.      Conjunctiva/sclera: Conjunctivae normal.      Pupils: Pupils are equal, round, and reactive to light.   Cardiovascular:      Rate and Rhythm: Normal rate and regular rhythm.      Pulses: Normal pulses.      Heart sounds: Normal heart sounds. No murmur heard.  Pulmonary:      Effort: Pulmonary effort is normal. No respiratory distress.      Breath sounds: Normal breath sounds. No wheezing or rales.   Abdominal:      General: Abdomen is flat. Bowel sounds are normal. There is no distension.      Palpations: Abdomen is soft. There is no mass.      Tenderness: There is no abdominal tenderness.   Musculoskeletal:         General: No swelling or deformity. Normal range of motion.      Cervical back: Normal range of motion and neck supple.      Right lower leg: No edema.      Left lower leg: No edema. "   Lymphadenopathy:      Cervical: No cervical adenopathy.   Skin:     General: Skin is warm and dry.      Capillary Refill: Capillary refill takes less than 2 seconds.      Findings: No lesion or rash.   Neurological:      General: No focal deficit present.      Mental Status: He is alert and oriented to person, place, and time.      Cranial Nerves: No cranial nerve deficit.      Motor: No weakness.   Psychiatric:         Mood and Affect: Mood normal.         Behavior: Behavior normal.         Thought Content: Thought content normal.         Judgment: Judgment normal.         Assessment/Plan   Problem List Items Addressed This Visit       Atrial fibrillation and flutter (CMS/[facility])    Relevant Orders    Follow Up In [facility] - PCP    Essential hypertension    Relevant Orders    Comprehensive Metabolic Panel    CBC and Auto Differential    Chronic CHF (CMS/[facility])    Mixed hyperlipidemia    Relevant Orders    Lipid Panel    Primary osteoarthritis involving multiple joints    Routine general medical examination at health care facility - Primary    Class 3 severe obesity due to excess calories with serious comorbidity and body mass index (BMI) of 50.0 to 59.9 in adult (CMS/[facility])    Prostate cancer screening    Relevant Orders    Prostate Specific Antigen     Other Visit Diagnoses       Prophylactic antibiotic        Relevant Medications    amoxicillin (Amoxil) 500 mg capsule        Patient education provided.  Stay current with age appropriate health maintenance as instructed.  Appointment here or ER with new or worsening symptoms'  Keep appropriate follow-up visit.  Stay current with proper immunizations  6 months    Weight loss and diet

## 2024-04-11 NOTE — TELEPHONE ENCOUNTER
Chelsie from [facility] called in regarding the patients prescription for amoxicillin. He stated the prescription states to take 4 capsules prior to a dental procedure but the quantity is 21 capsules. Chelsie is wondering if this is correct or should the patient only be receiving 4 capsules?  Please Adivse

## 2024-04-12 DIAGNOSIS — I50.42 CHRONIC COMBINED SYSTOLIC AND DIASTOLIC CONGESTIVE HEART FAILURE (MULTI): ICD-10-CM

## 2024-04-12 DIAGNOSIS — I10 ESSENTIAL HYPERTENSION: ICD-10-CM

## 2024-04-15 RX ORDER — IRBESARTAN 150 MG/1
150 TABLET ORAL DAILY
Qty: 90 TABLET | Refills: 3 | Status: SHIPPED | OUTPATIENT
Start: 2024-04-15

## 2024-04-15 RX ORDER — TERAZOSIN 10 MG/1
10 CAPSULE ORAL NIGHTLY
Qty: 90 CAPSULE | Refills: 3 | Status: SHIPPED | OUTPATIENT
Start: 2024-04-15

## 2024-04-15 NOTE — TELEPHONE ENCOUNTER
Name: Mag Pierce  :  1950     Date of last appointment:  2024   Date of next appointment:  10/8/2024     Best number to reach patient:  [phone]

## 2024-04-18 DIAGNOSIS — I10 ESSENTIAL HYPERTENSION: Primary | ICD-10-CM

## 2024-04-18 RX ORDER — FUROSEMIDE 40 MG/1
40 TABLET ORAL 2 TIMES DAILY
Qty: 180 TABLET | Refills: 3 | Status: SHIPPED | OUTPATIENT
Start: 2024-04-18

## 2024-04-18 NOTE — TELEPHONE ENCOUNTER
Rx Refill Request     Name: Mag Pierce  :  1950   Medication Name:  furosemide (Lasix) 40 mg tablet   Specific Pharmacy location:  [facility] - [place], [place]   Date of last appointment:  2024   Date of next appointment:  10/8/2024   Best number to reach patient:  [phone]

## 2024-05-11 DIAGNOSIS — I48.19 PERSISTENT ATRIAL FIBRILLATION (HCC): ICD-10-CM

## 2024-05-13 RX ORDER — DILTIAZEM HYDROCHLORIDE 180 MG/1
CAPSULE, COATED, EXTENDED RELEASE ORAL
Qty: 90 CAPSULE | Refills: 3 | Status: SHIPPED | OUTPATIENT
Start: 2024-05-13

## 2024-05-13 RX ORDER — APIXABAN 5 MG/1
5 TABLET, FILM COATED ORAL 2 TIMES DAILY
Qty: 180 TABLET | Refills: 3 | Status: SHIPPED | OUTPATIENT
Start: 2024-05-13

## 2024-05-13 NOTE — TELEPHONE ENCOUNTER
Requesting medication refill. Please approve or deny this request.    Rx requested:  Requested Prescriptions     Pending Prescriptions Disp Refills    dilTIAZem (CARDIZEM CD) 180 MG extended release capsule [Pharmacy Med Name: DilTIAZem CD CAP 180MG/24HR] 90 capsule 3     Sig: TAKE 1 CAPSULE BY MOUTH DAILY    ELIQUIS 5 MG TABS tablet [Pharmacy Med Name: Eliquis 5 MG Oral Tablet] 180 tablet 3     Sig: TAKE 1 TABLET BY MOUTH TWICE  DAILY         Last Office Visit:   5/23/2023      Next Visit Date:  Future Appointments   Date Time Provider Department Center   5/28/2024  8:00 AM Holiday, DO [facility]               Last refill 4/13/2023. Please approve or deny.

## 2024-05-28 ENCOUNTER — OFFICE VISIT (OUTPATIENT)
Dept: CARDIOLOGY CLINIC | Age: 74
End: 2024-05-28
Payer: MEDICARE

## 2024-05-28 VITALS
WEIGHT: 315 LBS | HEART RATE: 85 BPM | SYSTOLIC BLOOD PRESSURE: 138 MMHG | DIASTOLIC BLOOD PRESSURE: 84 MMHG | BODY MASS INDEX: 47.74 KG/M2 | HEIGHT: 68 IN

## 2024-05-28 DIAGNOSIS — I10 ESSENTIAL HYPERTENSION: ICD-10-CM

## 2024-05-28 DIAGNOSIS — E66.01 MORBID OBESITY DUE TO EXCESS CALORIES (HCC): ICD-10-CM

## 2024-05-28 DIAGNOSIS — Z86.79 HISTORY OF AORTIC STENOSIS: ICD-10-CM

## 2024-05-28 DIAGNOSIS — Z95.2 S/P TAVR (TRANSCATHETER AORTIC VALVE REPLACEMENT): ICD-10-CM

## 2024-05-28 DIAGNOSIS — E78.2 MIXED HYPERLIPIDEMIA: ICD-10-CM

## 2024-05-28 DIAGNOSIS — I48.19 PERSISTENT ATRIAL FIBRILLATION (HCC): Primary | ICD-10-CM

## 2024-05-28 PROCEDURE — 93000 ELECTROCARDIOGRAM COMPLETE: CPT | Performed by: INTERNAL MEDICINE

## 2024-05-28 PROCEDURE — 99214 OFFICE O/P EST MOD 30 MIN: CPT | Performed by: INTERNAL MEDICINE

## 2024-05-28 PROCEDURE — 3079F DIAST BP 80-89 MM HG: CPT | Performed by: INTERNAL MEDICINE

## 2024-05-28 PROCEDURE — G8427 DOCREV CUR MEDS BY ELIG CLIN: HCPCS | Performed by: INTERNAL MEDICINE

## 2024-05-28 PROCEDURE — G8417 CALC BMI ABV UP PARAM F/U: HCPCS | Performed by: INTERNAL MEDICINE

## 2024-05-28 PROCEDURE — 1123F ACP DISCUSS/DSCN MKR DOCD: CPT | Performed by: INTERNAL MEDICINE

## 2024-05-28 PROCEDURE — 3017F COLORECTAL CA SCREEN DOC REV: CPT | Performed by: INTERNAL MEDICINE

## 2024-05-28 PROCEDURE — 3075F SYST BP GE 130 - 139MM HG: CPT | Performed by: INTERNAL MEDICINE

## 2024-05-28 PROCEDURE — 1036F TOBACCO NON-USER: CPT | Performed by: INTERNAL MEDICINE

## 2024-05-28 NOTE — PROGRESS NOTES
Chief Complaint   Patient presents with    Atrial Fibrillation    Hypertension       4-26-18: Patient presents for initial medical evaluation. Patient is followed on a regular basis by Enrique Adams MD. Has been dealing with URI/bronchitis for the past week or so. Feeling tired, SOB. EKG today shows afibb at 111bpm. Does have orthopnea as well. No previous hx of afibb. No hx of MI, CHF or arrhythmias. No hx of LHC. Last stress test was 10 yrs ago. Pt denies chest pain,   nausea, vomiting, diarrhea, constipation, motor weakness, insomnia, weight loss, syncope, dizziness, lightheadedness, palpitations, PND, orthopnea, or claudication. + cough and nasal congestion.     5-3-18: feeling better overall, still SOB with mild exertion but much improved from last office visit. . s/p CXR with cardiomegaly and right pleural effusion. s/p ECHO with EF of 45%, mod LAE, Severe AS with Vmax of 4.3m/s, Peak of 74mmHg, mean of 38mmHg, AMARILYS 0.84cm2, RVSP of 35mmHg. BNP was 7000, lipid profile is ok, trop was negative. Renal function is normal. Diuresed well with lasix. HR and BP are controlled now. EKG with Afibb at 85bpm today. Pt denies chest pain,   nausea, vomiting, diarrhea, constipation, motor weakness, insomnia, weight loss, syncope, dizziness, lightheadedness, palpitations, PND, orthopnea.  No LE discoloration or ulcers. + b/l  LE edema.Lipid profile is normal. No recent hospitalization.      5-15-18: s/p LHC with mild CAD, valve not able to be crossed but did not try hard to cross. s/p EMMANUEL with severe AS, EF of 35-40%, mild MR, mild AI, mild PHTN. s/p unsuccessful CV . EKG today with afibb at 84bpm. Pt denies chest pain, dyspnea, dyspnea on exertion, change in exercise capacity,  nausea, vomiting, diarrhea, constipation, motor weakness, insomnia, weight loss, syncope, dizziness, lightheadedness, palpitations, PND, orthopnea, or claudication. He feels very tired and sleeps all the time.he has an appointment with  for

## 2024-05-29 ENCOUNTER — HOSPITAL ENCOUNTER (OUTPATIENT)
Dept: CARDIOLOGY | Facility: HOSPITAL | Age: 74
Discharge: HOME | End: 2024-05-29
Payer: MEDICARE

## 2024-05-29 DIAGNOSIS — I48.0 PAROXYSMAL ATRIAL FIBRILLATION (MULTI): ICD-10-CM

## 2024-05-29 DIAGNOSIS — Z95.0 PACEMAKER: ICD-10-CM

## 2024-05-29 PROCEDURE — 93296 REM INTERROG EVL PM/IDS: CPT

## 2024-08-19 ENCOUNTER — HOSPITAL ENCOUNTER (OUTPATIENT)
Dept: CARDIOLOGY | Facility: HOSPITAL | Age: 74
Discharge: HOME | End: 2024-08-19
Payer: MEDICARE

## 2024-08-19 DIAGNOSIS — I48.0 PAROXYSMAL ATRIAL FIBRILLATION (MULTI): ICD-10-CM

## 2024-08-19 DIAGNOSIS — Z95.0 PACEMAKER: ICD-10-CM

## 2024-08-19 PROCEDURE — 93280 PM DEVICE PROGR EVAL DUAL: CPT

## 2024-09-30 DIAGNOSIS — I10 ESSENTIAL HYPERTENSION: ICD-10-CM

## 2024-09-30 DIAGNOSIS — I48.91 ATRIAL FIBRILLATION AND FLUTTER (HCC): ICD-10-CM

## 2024-09-30 DIAGNOSIS — I48.92 ATRIAL FIBRILLATION AND FLUTTER (HCC): ICD-10-CM

## 2024-10-01 RX ORDER — METOPROLOL TARTRATE 50 MG
TABLET ORAL
Qty: 180 TABLET | Refills: 3 | Status: SHIPPED | OUTPATIENT
Start: 2024-10-01

## 2024-10-01 NOTE — TELEPHONE ENCOUNTER
Requesting medication refill. Please approve or deny this request.    Rx requested:  Requested Prescriptions     Pending Prescriptions Disp Refills    metoprolol tartrate (LOPRESSOR) 50 MG tablet [Pharmacy Med Name: Metoprolol Tartrate 50 MG Oral Tablet] 180 tablet 3     Sig: TAKE 1 TABLET BY MOUTH TWICE  DAILY         Last Office Visit:   05/28/2024      Next Visit Date:  Future Appointments   Date Time Provider Department Center   5/13/2025  8:30 AM Holiday, DO [facility]               Last refill 11/24/2023. Please approve or deny.

## 2024-10-02 NOTE — PROGRESS NOTES
"Subjective   Patient ID: Mag Pierce is a 74 y.o. male who presents for Atrial Fibrillation, Hypertension, and Hyperlipidemia.  HPI    Right leg has a bump on his shin, noticed about a week ago patient struck it on his car door    Obese recommend weight loss and diet    Hypertension stable no chest pain or shortness of breath    A-fib stable on Eliquis  Keep cardiac follow-up      Review of Systems   Constitutional:  Negative for activity change and fatigue.   HENT:  Negative for congestion and sore throat.    Eyes:  Negative for discharge.   Respiratory:  Negative for cough, chest tightness and shortness of breath.    Cardiovascular:  Negative for chest pain and leg swelling.   Gastrointestinal:  Negative for abdominal pain, blood in stool, constipation, diarrhea, nausea and vomiting.   Endocrine: Negative for cold intolerance and heat intolerance.   Genitourinary:  Negative for difficulty urinating and hematuria.   Musculoskeletal:  Negative for arthralgias, back pain, gait problem, myalgias and neck pain.   Skin:         R shin soft nodule   Allergic/Immunologic: Negative for environmental allergies.   Neurological:  Negative for dizziness, syncope, weakness, numbness and headaches.   Hematological:  Negative for adenopathy. Does not bruise/bleed easily.   Psychiatric/Behavioral:  Negative for dysphoric mood. The patient is not nervous/anxious.    All other systems reviewed and are negative.      Objective   /78 (BP Location: Right arm, BP Cuff Size: Large adult)   Pulse 84   Ht 1.727 m (5' 8\")   Wt (!) 168 kg (370 lb)   SpO2 96%   BMI 56.26 kg/m²    Physical Exam  Vitals and nursing note reviewed.   Constitutional:       General: He is not in acute distress.     Appearance: Normal appearance.   HENT:      Head: Normocephalic and atraumatic.      Right Ear: Tympanic membrane, ear canal and external ear normal.      Left Ear: Tympanic membrane, ear canal and external ear normal.      Nose: Nose normal. "      Mouth/Throat:      Mouth: Mucous membranes are moist.      Pharynx: Oropharynx is clear. No oropharyngeal exudate or posterior oropharyngeal erythema.   Eyes:      Extraocular Movements: Extraocular movements intact.      Conjunctiva/sclera: Conjunctivae normal.      Pupils: Pupils are equal, round, and reactive to light.   Cardiovascular:      Rate and Rhythm: Normal rate and regular rhythm.      Pulses: Normal pulses.      Heart sounds: Normal heart sounds. No murmur heard.  Pulmonary:      Effort: Pulmonary effort is normal. No respiratory distress.      Breath sounds: Normal breath sounds. No wheezing or rales.   Abdominal:      General: Abdomen is flat. Bowel sounds are normal. There is no distension.      Palpations: Abdomen is soft. There is no mass.      Tenderness: There is no abdominal tenderness.   Musculoskeletal:         General: No swelling or deformity. Normal range of motion.      Cervical back: Normal range of motion and neck supple.      Right lower leg: No edema.      Left lower leg: No edema.   Lymphadenopathy:      Cervical: No cervical adenopathy.   Skin:     General: Skin is warm and dry.      Capillary Refill: Capillary refill takes less than 2 seconds.      Findings: Bruising and lesion present. No rash.   Neurological:      General: No focal deficit present.      Mental Status: He is alert and oriented to person, place, and time.      Cranial Nerves: No cranial nerve deficit.      Motor: No weakness.   Psychiatric:         Mood and Affect: Mood normal.         Behavior: Behavior normal.         Thought Content: Thought content normal.         Judgment: Judgment normal.     Soft 3 cm r shin nodule with ecchymosis    Assessment/Plan   Problem List Items Addressed This Visit       Atrial fibrillation and flutter    Relevant Orders    CBC and Auto Differential    Comprehensive Metabolic Panel    Essential hypertension    Mixed hyperlipidemia    Relevant Orders    Lipid Panel    Class 3  severe obesity due to excess calories with serious comorbidity and body mass index (BMI) of 50.0 to 59.9 in adult    Prostate cancer screening    Relevant Orders    Prostate Specific Antigen     Other Visit Diagnoses       Colon cancer screening    -  Primary    Relevant Orders    Cologuard® colon cancer screening            Patient education provided.  Stay current with age appropriate health maintenance as instructed.  Appointment here or ER with new or worsening symptoms'  Keep appropriate follow-up visit.  Stay current with proper immunizations he declines discussed risks  6 months  Weight loss and diet  keep specialist follow-up

## 2024-10-08 ENCOUNTER — APPOINTMENT (OUTPATIENT)
Dept: PRIMARY CARE | Facility: CLINIC | Age: 74
End: 2024-10-08
Payer: MEDICARE

## 2024-10-08 VITALS
BODY MASS INDEX: 47.74 KG/M2 | OXYGEN SATURATION: 96 % | DIASTOLIC BLOOD PRESSURE: 78 MMHG | HEART RATE: 84 BPM | SYSTOLIC BLOOD PRESSURE: 134 MMHG | WEIGHT: 315 LBS | HEIGHT: 68 IN

## 2024-10-08 DIAGNOSIS — Z12.11 COLON CANCER SCREENING: Primary | ICD-10-CM

## 2024-10-08 DIAGNOSIS — I48.92 ATRIAL FIBRILLATION AND FLUTTER: ICD-10-CM

## 2024-10-08 DIAGNOSIS — Z12.5 PROSTATE CANCER SCREENING: ICD-10-CM

## 2024-10-08 DIAGNOSIS — E66.01 CLASS 3 SEVERE OBESITY DUE TO EXCESS CALORIES WITH SERIOUS COMORBIDITY AND BODY MASS INDEX (BMI) OF 50.0 TO 59.9 IN ADULT: ICD-10-CM

## 2024-10-08 DIAGNOSIS — I10 ESSENTIAL HYPERTENSION: ICD-10-CM

## 2024-10-08 DIAGNOSIS — I48.91 ATRIAL FIBRILLATION AND FLUTTER: ICD-10-CM

## 2024-10-08 DIAGNOSIS — E78.2 MIXED HYPERLIPIDEMIA: ICD-10-CM

## 2024-10-08 DIAGNOSIS — E66.813 CLASS 3 SEVERE OBESITY DUE TO EXCESS CALORIES WITH SERIOUS COMORBIDITY AND BODY MASS INDEX (BMI) OF 50.0 TO 59.9 IN ADULT: ICD-10-CM

## 2024-10-08 PROCEDURE — 99214 OFFICE O/P EST MOD 30 MIN: CPT | Performed by: FAMILY MEDICINE

## 2024-10-08 PROCEDURE — 3078F DIAST BP <80 MM HG: CPT | Performed by: FAMILY MEDICINE

## 2024-10-08 PROCEDURE — 3008F BODY MASS INDEX DOCD: CPT | Performed by: FAMILY MEDICINE

## 2024-10-08 PROCEDURE — 3075F SYST BP GE 130 - 139MM HG: CPT | Performed by: FAMILY MEDICINE

## 2024-10-08 PROCEDURE — 1160F RVW MEDS BY RX/DR IN RCRD: CPT | Performed by: FAMILY MEDICINE

## 2024-10-08 PROCEDURE — 1159F MED LIST DOCD IN RCRD: CPT | Performed by: FAMILY MEDICINE

## 2024-10-08 PROCEDURE — 1036F TOBACCO NON-USER: CPT | Performed by: FAMILY MEDICINE

## 2024-10-08 PROCEDURE — 1123F ACP DISCUSS/DSCN MKR DOCD: CPT | Performed by: FAMILY MEDICINE

## 2024-10-08 ASSESSMENT — ENCOUNTER SYMPTOMS
NECK PAIN: 0
ABDOMINAL PAIN: 0
HEMATURIA: 0
NUMBNESS: 0
DIZZINESS: 0
BLOOD IN STOOL: 0
ACTIVITY CHANGE: 0
NAUSEA: 0
CONSTIPATION: 0
NERVOUS/ANXIOUS: 0
MYALGIAS: 0
BRUISES/BLEEDS EASILY: 0
COUGH: 0
BACK PAIN: 0
EYE DISCHARGE: 0
FATIGUE: 0
CHEST TIGHTNESS: 0
DYSPHORIC MOOD: 0
DIFFICULTY URINATING: 0
HEADACHES: 0
SORE THROAT: 0
VOMITING: 0
DIARRHEA: 0
ARTHRALGIAS: 0
SHORTNESS OF BREATH: 0
WEAKNESS: 0
ADENOPATHY: 0

## 2024-11-20 ENCOUNTER — HOSPITAL ENCOUNTER (OUTPATIENT)
Dept: CARDIOLOGY | Facility: HOSPITAL | Age: 74
Discharge: HOME | End: 2024-11-20
Payer: MEDICARE

## 2024-11-20 DIAGNOSIS — I49.5 SICK SINUS SYNDROME (MULTI): ICD-10-CM

## 2024-11-20 DIAGNOSIS — Z95.0 PACEMAKER: ICD-10-CM

## 2024-11-20 PROCEDURE — 93296 REM INTERROG EVL PM/IDS: CPT

## 2025-02-24 ENCOUNTER — HOSPITAL ENCOUNTER (OUTPATIENT)
Dept: CARDIOLOGY | Facility: HOSPITAL | Age: 75
Discharge: HOME | End: 2025-02-24
Payer: MEDICARE

## 2025-02-24 DIAGNOSIS — Z95.0 PACEMAKER: ICD-10-CM

## 2025-02-24 DIAGNOSIS — I49.5 SICK SINUS SYNDROME (MULTI): ICD-10-CM

## 2025-02-24 PROCEDURE — 93296 REM INTERROG EVL PM/IDS: CPT

## 2025-02-25 DIAGNOSIS — I10 ESSENTIAL HYPERTENSION: ICD-10-CM

## 2025-02-25 DIAGNOSIS — I50.42 CHRONIC COMBINED SYSTOLIC AND DIASTOLIC CONGESTIVE HEART FAILURE: ICD-10-CM

## 2025-02-26 NOTE — TELEPHONE ENCOUNTER
Rx Refill Request     Name: Mag Pierce  :  1950   Medication Name:  irbesartan (Avapro) 150 mg tablet      terazosin (Hytrin) 10 mg capsule   Specific Pharmacy location:  [facility]   Date of last appointment:  10/8/2024   Date of next appointment:  2025   Best number to reach patient:  [phone]

## 2025-02-27 RX ORDER — TERAZOSIN 10 MG/1
10 CAPSULE ORAL NIGHTLY
Qty: 90 CAPSULE | Refills: 1 | Status: SHIPPED | OUTPATIENT
Start: 2025-02-27

## 2025-02-27 RX ORDER — IRBESARTAN 150 MG/1
150 TABLET ORAL DAILY
Qty: 90 TABLET | Refills: 1 | Status: SHIPPED | OUTPATIENT
Start: 2025-02-27

## 2025-03-19 DIAGNOSIS — I48.19 PERSISTENT ATRIAL FIBRILLATION (HCC): ICD-10-CM

## 2025-03-20 RX ORDER — DILTIAZEM HYDROCHLORIDE 180 MG/1
180 CAPSULE, COATED, EXTENDED RELEASE ORAL DAILY
Qty: 90 CAPSULE | Refills: 0 | Status: SHIPPED | OUTPATIENT
Start: 2025-03-20

## 2025-03-20 RX ORDER — APIXABAN 5 MG/1
5 TABLET, FILM COATED ORAL 2 TIMES DAILY
Qty: 180 TABLET | Refills: 0 | Status: SHIPPED | OUTPATIENT
Start: 2025-03-20

## 2025-03-20 NOTE — TELEPHONE ENCOUNTER
Requesting medication refill. Please approve or deny this request.    Rx requested:  Requested Prescriptions     Pending Prescriptions Disp Refills    ELIQUIS 5 MG TABS tablet [Pharmacy Med Name: Eliquis 5 MG Oral Tablet] 180 tablet 3     Sig: TAKE 1 TABLET BY MOUTH TWICE  DAILY    dilTIAZem (CARDIZEM CD) 180 MG extended release capsule [Pharmacy Med Name: DilTIAZem CD CAP 180MG/24HR] 90 capsule 3     Sig: TAKE 1 CAPSULE BY MOUTH DAILY         Last Office Visit:   Visit date not found      Next Visit Date:  Future Appointments   Date Time Provider Department Center   5/13/2025  8:30 AM [place], DO [facility]

## 2025-04-08 ENCOUNTER — APPOINTMENT (OUTPATIENT)
Dept: PRIMARY CARE | Facility: CLINIC | Age: 75
End: 2025-04-08
Payer: MEDICARE

## 2025-04-08 NOTE — PROGRESS NOTES
Subjective   Reason for Visit: Mag Pierce is an 75 y.o. male here for a Medicare Wellness visit.     Past Medical, Surgical, and Family History reviewed and updated in chart.    Reviewed all medications by prescribing practitioner or clinical pharmacist (such as prescriptions, OTCs, herbal therapies and supplements) and documented in the medical record.    HPI  Patient here for annual Medicare wellness    Also general checkup    Hypertension stable  No chest pain or shortness of breath  Tolerates medicine  Stay current with proper blood work    A-fib and congestive heart failure both stable  No recurrence  Will be seeing cardiology in a few weeks  No evidence for recurrence    High cholesterol stable  Watch diet follow blood work  Will do blood work today    Obese  Recommend weight loss and diet  This has been challenging for the patient  We discussed importance of good nutrition    Seasonal allergies stable  No progression or worsening    Osteoarthritis stable multiple joints  Patient uses a walker  Keep  orthopedic follow-up  Patient Care Team:  David Varghese MD as PCP - General  David Varghese MD as PCP [facility]     Review of Systems   Constitutional:  Negative for activity change and fatigue.   HENT:  Negative for congestion and sore throat.    Eyes:  Negative for discharge.   Respiratory:  Negative for cough, chest tightness and shortness of breath.    Cardiovascular:  Negative for chest pain and leg swelling.   Gastrointestinal:  Negative for abdominal pain, blood in stool, constipation, diarrhea, nausea and vomiting.   Endocrine: Negative for cold intolerance and heat intolerance.   Genitourinary:  Negative for difficulty urinating and hematuria.   Musculoskeletal:  Negative for arthralgias, back pain, gait problem, myalgias and neck pain.   Allergic/Immunologic: Negative for environmental allergies.   Neurological:  Negative for dizziness, syncope, weakness, numbness and  "headaches.   Hematological:  Negative for adenopathy. Does not bruise/bleed easily.   Psychiatric/Behavioral:  Negative for dysphoric mood. The patient is not nervous/anxious.    All other systems reviewed and are negative.      Objective   Vitals:  /70 (BP Location: Right arm, BP Cuff Size: Large adult long)   Pulse 88   Ht 1.727 m (5' 8\")   Wt (!) 163 kg (360 lb)   SpO2 96%   BMI 54.74 kg/m²       Physical Exam  Vitals and nursing note reviewed.   Constitutional:       General: He is not in acute distress.     Appearance: Normal appearance. He is obese.   HENT:      Head: Normocephalic and atraumatic.      Right Ear: Tympanic membrane, ear canal and external ear normal.      Left Ear: Tympanic membrane, ear canal and external ear normal.      Nose: Nose normal.      Mouth/Throat:      Mouth: Mucous membranes are moist.      Pharynx: Oropharynx is clear. No oropharyngeal exudate or posterior oropharyngeal erythema.   Eyes:      Extraocular Movements: Extraocular movements intact.      Conjunctiva/sclera: Conjunctivae normal.      Pupils: Pupils are equal, round, and reactive to light.   Cardiovascular:      Rate and Rhythm: Normal rate and regular rhythm.      Pulses: Normal pulses.      Heart sounds: Normal heart sounds. No murmur heard.  Pulmonary:      Effort: Pulmonary effort is normal. No respiratory distress.      Breath sounds: Normal breath sounds. No wheezing or rales.   Abdominal:      General: Abdomen is flat. Bowel sounds are normal. There is no distension.      Palpations: Abdomen is soft. There is no mass.      Tenderness: There is no abdominal tenderness.   Musculoskeletal:         General: No swelling or deformity. Normal range of motion.      Cervical back: Normal range of motion and neck supple.      Right lower leg: No edema.      Left lower leg: No edema.   Lymphadenopathy:      Cervical: No cervical adenopathy.   Skin:     General: Skin is warm and dry.      Capillary Refill: " Capillary refill takes less than 2 seconds.      Findings: No lesion or rash.   Neurological:      General: No focal deficit present.      Mental Status: He is alert and oriented to person, place, and time.      Cranial Nerves: No cranial nerve deficit.      Motor: No weakness.   Psychiatric:         Mood and Affect: Mood normal.         Behavior: Behavior normal.         Thought Content: Thought content normal.         Judgment: Judgment normal.         Assessment & Plan  Routine general medical examination at health care facility    Orders:    1 Year Follow Up In [facility] - PCP - Wellness Exam; Future    Essential hypertension    Orders:    furosemide (Lasix) 40 mg tablet; Take 1 tablet (40 mg) by mouth 2 times a day.    Follow Up In [facility] - PCP; Future    Chronic atrial fibrillation (Multi)         Mixed hyperlipidemia         Class 3 severe obesity due to excess calories with serious comorbidity and body mass index (BMI) of 50.0 to 59.9 in adult         Seasonal allergies         Primary osteoarthritis involving multiple joints         Chronic congestive heart failure, unspecified heart failure type         Chronic combined systolic and diastolic congestive heart failure         ACP reviewed  Rechecks 6 months and as needed  Weight loss and diet  Blood work as noted  Keep follow-up with specialist as well  Discussed at length with patient     Patient education provided.  Stay current with age appropriate health maintenance as instructed.  Appointment here or ER with new or worsening symptoms'  Keep appropriate follow-up visit.  Stay current with proper immunizations

## 2025-04-18 ENCOUNTER — APPOINTMENT (OUTPATIENT)
Dept: PRIMARY CARE | Facility: CLINIC | Age: 75
End: 2025-04-18
Payer: MEDICARE

## 2025-04-18 VITALS
OXYGEN SATURATION: 96 % | HEART RATE: 88 BPM | HEIGHT: 68 IN | DIASTOLIC BLOOD PRESSURE: 70 MMHG | WEIGHT: 315 LBS | SYSTOLIC BLOOD PRESSURE: 139 MMHG | BODY MASS INDEX: 47.74 KG/M2

## 2025-04-18 DIAGNOSIS — Z00.00 ROUTINE GENERAL MEDICAL EXAMINATION AT HEALTH CARE FACILITY: Primary | ICD-10-CM

## 2025-04-18 DIAGNOSIS — J30.2 SEASONAL ALLERGIES: ICD-10-CM

## 2025-04-18 DIAGNOSIS — I50.42 CHRONIC COMBINED SYSTOLIC AND DIASTOLIC CONGESTIVE HEART FAILURE: ICD-10-CM

## 2025-04-18 DIAGNOSIS — E78.2 MIXED HYPERLIPIDEMIA: ICD-10-CM

## 2025-04-18 DIAGNOSIS — I10 ESSENTIAL HYPERTENSION: ICD-10-CM

## 2025-04-18 DIAGNOSIS — I48.20 CHRONIC ATRIAL FIBRILLATION (MULTI): ICD-10-CM

## 2025-04-18 DIAGNOSIS — E66.813 CLASS 3 SEVERE OBESITY DUE TO EXCESS CALORIES WITH SERIOUS COMORBIDITY AND BODY MASS INDEX (BMI) OF 50.0 TO 59.9 IN ADULT: ICD-10-CM

## 2025-04-18 DIAGNOSIS — I50.9 CHRONIC CONGESTIVE HEART FAILURE, UNSPECIFIED HEART FAILURE TYPE: ICD-10-CM

## 2025-04-18 DIAGNOSIS — E66.01 CLASS 3 SEVERE OBESITY DUE TO EXCESS CALORIES WITH SERIOUS COMORBIDITY AND BODY MASS INDEX (BMI) OF 50.0 TO 59.9 IN ADULT: ICD-10-CM

## 2025-04-18 DIAGNOSIS — M15.0 PRIMARY OSTEOARTHRITIS INVOLVING MULTIPLE JOINTS: ICD-10-CM

## 2025-04-18 PROCEDURE — 1170F FXNL STATUS ASSESSED: CPT | Performed by: FAMILY MEDICINE

## 2025-04-18 PROCEDURE — 1124F ACP DISCUSS-NO DSCNMKR DOCD: CPT | Performed by: FAMILY MEDICINE

## 2025-04-18 PROCEDURE — G0439 PPPS, SUBSEQ VISIT: HCPCS | Performed by: FAMILY MEDICINE

## 2025-04-18 PROCEDURE — 3078F DIAST BP <80 MM HG: CPT | Performed by: FAMILY MEDICINE

## 2025-04-18 PROCEDURE — 1159F MED LIST DOCD IN RCRD: CPT | Performed by: FAMILY MEDICINE

## 2025-04-18 PROCEDURE — 3075F SYST BP GE 130 - 139MM HG: CPT | Performed by: FAMILY MEDICINE

## 2025-04-18 PROCEDURE — 1036F TOBACCO NON-USER: CPT | Performed by: FAMILY MEDICINE

## 2025-04-18 PROCEDURE — 1160F RVW MEDS BY RX/DR IN RCRD: CPT | Performed by: FAMILY MEDICINE

## 2025-04-18 PROCEDURE — 99214 OFFICE O/P EST MOD 30 MIN: CPT | Performed by: FAMILY MEDICINE

## 2025-04-18 RX ORDER — FUROSEMIDE 40 MG/1
40 TABLET ORAL 2 TIMES DAILY
Qty: 180 TABLET | Refills: 3 | Status: SHIPPED | OUTPATIENT
Start: 2025-04-18

## 2025-04-18 ASSESSMENT — ENCOUNTER SYMPTOMS
ABDOMINAL PAIN: 0
DIFFICULTY URINATING: 0
DIARRHEA: 0
DIZZINESS: 0
NECK PAIN: 0
FATIGUE: 0
DEPRESSION: 0
NAUSEA: 0
BRUISES/BLEEDS EASILY: 0
NUMBNESS: 0
ARTHRALGIAS: 0
EYE DISCHARGE: 0
LOSS OF SENSATION IN FEET: 0
COUGH: 0
DYSPHORIC MOOD: 0
SORE THROAT: 0
ACTIVITY CHANGE: 0
HEMATURIA: 0
HEADACHES: 0
CONSTIPATION: 0
OCCASIONAL FEELINGS OF UNSTEADINESS: 0
CHEST TIGHTNESS: 0
MYALGIAS: 0
ADENOPATHY: 0
VOMITING: 0
WEAKNESS: 0
NERVOUS/ANXIOUS: 0
BLOOD IN STOOL: 0
SHORTNESS OF BREATH: 0
BACK PAIN: 0

## 2025-04-18 ASSESSMENT — ACTIVITIES OF DAILY LIVING (ADL)
BATHING: INDEPENDENT
DOING_HOUSEWORK: INDEPENDENT
TAKING_MEDICATION: INDEPENDENT
MANAGING_FINANCES: INDEPENDENT
GROCERY_SHOPPING: INDEPENDENT
DRESSING: INDEPENDENT

## 2025-04-18 ASSESSMENT — PATIENT HEALTH QUESTIONNAIRE - PHQ9
2. FEELING DOWN, DEPRESSED OR HOPELESS: NOT AT ALL
1. LITTLE INTEREST OR PLEASURE IN DOING THINGS: NOT AT ALL
SUM OF ALL RESPONSES TO PHQ9 QUESTIONS 1 AND 2: 0

## 2025-04-18 NOTE — ASSESSMENT & PLAN NOTE
Orders:    furosemide (Lasix) 40 mg tablet; Take 1 tablet (40 mg) by mouth 2 times a day.    Follow Up In [facility] - PCP; Future

## 2025-04-19 LAB
ALBUMIN SERPL-MCNC: 4.2 G/DL (ref 3.6–5.1)
ALP SERPL-CCNC: 47 U/L (ref 35–144)
ALT SERPL-CCNC: 9 U/L (ref 9–46)
ANION GAP SERPL CALCULATED.4IONS-SCNC: 11 MMOL/L (CALC) (ref 7–17)
AST SERPL-CCNC: 13 U/L (ref 10–35)
BASOPHILS # BLD AUTO: 51 CELLS/UL (ref 0–200)
BASOPHILS NFR BLD AUTO: 0.7 %
BILIRUB SERPL-MCNC: 0.6 MG/DL (ref 0.2–1.2)
BUN SERPL-MCNC: 29 MG/DL (ref 7–25)
CALCIUM SERPL-MCNC: 9.2 MG/DL (ref 8.6–10.3)
CHLORIDE SERPL-SCNC: 105 MMOL/L (ref 98–110)
CHOLEST SERPL-MCNC: 185 MG/DL
CHOLEST/HDLC SERPL: 4 (CALC)
CO2 SERPL-SCNC: 26 MMOL/L (ref 20–32)
CREAT SERPL-MCNC: 0.98 MG/DL (ref 0.7–1.28)
EGFRCR SERPLBLD CKD-EPI 2021: 80 ML/MIN/1.73M2
EOSINOPHIL # BLD AUTO: 212 CELLS/UL (ref 15–500)
EOSINOPHIL NFR BLD AUTO: 2.9 %
ERYTHROCYTE [DISTWIDTH] IN BLOOD BY AUTOMATED COUNT: 12.7 % (ref 11–15)
GLUCOSE SERPL-MCNC: 108 MG/DL (ref 65–99)
HCT VFR BLD AUTO: 38.8 % (ref 38.5–50)
HDLC SERPL-MCNC: 46 MG/DL
HGB BLD-MCNC: 13 G/DL (ref 13.2–17.1)
LDLC SERPL CALC-MCNC: 123 MG/DL (CALC)
LYMPHOCYTES # BLD AUTO: 1007 CELLS/UL (ref 850–3900)
LYMPHOCYTES NFR BLD AUTO: 13.8 %
MCH RBC QN AUTO: 35 PG (ref 27–33)
MCHC RBC AUTO-ENTMCNC: 33.5 G/DL (ref 32–36)
MCV RBC AUTO: 104.6 FL (ref 80–100)
MONOCYTES # BLD AUTO: 635 CELLS/UL (ref 200–950)
MONOCYTES NFR BLD AUTO: 8.7 %
NEUTROPHILS # BLD AUTO: 5395 CELLS/UL (ref 1500–7800)
NEUTROPHILS NFR BLD AUTO: 73.9 %
NONHDLC SERPL-MCNC: 139 MG/DL (CALC)
PLATELET # BLD AUTO: 217 THOUSAND/UL (ref 140–400)
PMV BLD REES-ECKER: 10.2 FL (ref 7.5–12.5)
POTASSIUM SERPL-SCNC: 4.1 MMOL/L (ref 3.5–5.3)
PROT SERPL-MCNC: 6.8 G/DL (ref 6.1–8.1)
PSA SERPL-MCNC: 4.1 NG/ML
RBC # BLD AUTO: 3.71 MILLION/UL (ref 4.2–5.8)
SODIUM SERPL-SCNC: 142 MMOL/L (ref 135–146)
TRIGL SERPL-MCNC: 69 MG/DL
WBC # BLD AUTO: 7.3 THOUSAND/UL (ref 3.8–10.8)

## 2025-05-13 ENCOUNTER — OFFICE VISIT (OUTPATIENT)
Dept: CARDIOLOGY CLINIC | Age: 75
End: 2025-05-13
Payer: MEDICARE

## 2025-05-13 VITALS
DIASTOLIC BLOOD PRESSURE: 68 MMHG | HEART RATE: 84 BPM | WEIGHT: 315 LBS | BODY MASS INDEX: 54.74 KG/M2 | SYSTOLIC BLOOD PRESSURE: 118 MMHG | OXYGEN SATURATION: 95 % | RESPIRATION RATE: 18 BRPM

## 2025-05-13 DIAGNOSIS — I48.19 PERSISTENT ATRIAL FIBRILLATION (HCC): Primary | ICD-10-CM

## 2025-05-13 DIAGNOSIS — R01.1 CARDIAC MURMUR: ICD-10-CM

## 2025-05-13 DIAGNOSIS — I48.91 ATRIAL FIBRILLATION AND FLUTTER (HCC): ICD-10-CM

## 2025-05-13 DIAGNOSIS — I48.92 ATRIAL FIBRILLATION AND FLUTTER (HCC): ICD-10-CM

## 2025-05-13 DIAGNOSIS — Z95.0 PACEMAKER: ICD-10-CM

## 2025-05-13 DIAGNOSIS — Z95.2 S/P TAVR (TRANSCATHETER AORTIC VALVE REPLACEMENT): ICD-10-CM

## 2025-05-13 DIAGNOSIS — E66.01 MORBID OBESITY DUE TO EXCESS CALORIES (HCC): ICD-10-CM

## 2025-05-13 DIAGNOSIS — E78.2 MIXED HYPERLIPIDEMIA: ICD-10-CM

## 2025-05-13 DIAGNOSIS — I10 ESSENTIAL HYPERTENSION: ICD-10-CM

## 2025-05-13 DIAGNOSIS — Z86.79 HISTORY OF AORTIC STENOSIS: ICD-10-CM

## 2025-05-13 PROCEDURE — 4004F PT TOBACCO SCREEN RCVD TLK: CPT | Performed by: INTERNAL MEDICINE

## 2025-05-13 PROCEDURE — 3074F SYST BP LT 130 MM HG: CPT | Performed by: INTERNAL MEDICINE

## 2025-05-13 PROCEDURE — 99214 OFFICE O/P EST MOD 30 MIN: CPT | Performed by: INTERNAL MEDICINE

## 2025-05-13 PROCEDURE — 93000 ELECTROCARDIOGRAM COMPLETE: CPT | Performed by: INTERNAL MEDICINE

## 2025-05-13 PROCEDURE — 1159F MED LIST DOCD IN RCRD: CPT | Performed by: INTERNAL MEDICINE

## 2025-05-13 PROCEDURE — 3078F DIAST BP <80 MM HG: CPT | Performed by: INTERNAL MEDICINE

## 2025-05-13 PROCEDURE — 1126F AMNT PAIN NOTED NONE PRSNT: CPT | Performed by: INTERNAL MEDICINE

## 2025-05-13 PROCEDURE — 1123F ACP DISCUSS/DSCN MKR DOCD: CPT | Performed by: INTERNAL MEDICINE

## 2025-05-13 PROCEDURE — G8417 CALC BMI ABV UP PARAM F/U: HCPCS | Performed by: INTERNAL MEDICINE

## 2025-05-13 PROCEDURE — 3017F COLORECTAL CA SCREEN DOC REV: CPT | Performed by: INTERNAL MEDICINE

## 2025-05-13 PROCEDURE — 1160F RVW MEDS BY RX/DR IN RCRD: CPT | Performed by: INTERNAL MEDICINE

## 2025-05-13 PROCEDURE — G8427 DOCREV CUR MEDS BY ELIG CLIN: HCPCS | Performed by: INTERNAL MEDICINE

## 2025-05-13 NOTE — PROGRESS NOTES
pacemaker placement.  He denies any bleeding issues.  Patient with history of cardiac catheterization with mild CAD.  He denies any recent hospitalizations.  No symptoms of CHF or angina.  Dealing with a left rotator cuff injury.  Patient with history of severe aortic stenosis status post TAVR at .  EKG today with normal sinus rhythm, nonspecific interventricular conduction delay/incomplete left bundle branch block    11-23-21: Patient is doing well overall.  No recent hospitalizations.  Patient with history of paroxysmal atrial fibrillation and is on oral anticoagulation.  History of sick sinus syndrome status post permanent pacemaker placement.  Most recent pacemaker check with no significant A. fib burden.  Patient with history of severe aortic stenosis status post TAVR.  EKG today shows likely normal sinus rhythm, baseline artifact and V paced rhythm.  Status post echocardiogram in May 13, 2021 showing ejection fraction of 60%, normal functioning TAVR with a mean gradient of 11 mmHg.    5-31-22:  Patient with history of paroxysmal atrial fibrillation and is on oral anticoagulation.  No bleeding issues. History of sick sinus syndrome status post permanent pacemaker placement.  Most recent pacemaker check with no significant A. fib burden.  Patient with history of severe aortic stenosis status post TAVR. No angina or CHF type symptoms.   EKG with NSR, LBBB.     11-29-22: hx of pafib. Hx of SSS s/ pPPM. Patient with history of severe aortic stenosis status post TAVR. Pt denies chest pain, dyspnea, dyspnea on exertion, change in exercise capacity, nausea, vomiting, diarrhea, constipation, motor weakness, insomnia, weight loss, syncope, dizziness, lightheadedness, palpitations, PND, orthopnea, or claudication.last echo in 5/2021 with normal functioning TAVR. On DOAC. No bleeding issues.   Last PPM check in 8/2022 with 2.3% afib burden, longest episode was 7 hours.   EKG with NSR    5-23-23: Patient is doing good

## 2025-05-20 ENCOUNTER — TRANSCRIBE ORDERS (OUTPATIENT)
Dept: SCHEDULING | Age: 75
End: 2025-05-20
Payer: MEDICARE

## 2025-05-20 DIAGNOSIS — I48.19 OTHER PERSISTENT ATRIAL FIBRILLATION: Primary | ICD-10-CM

## 2025-05-21 DIAGNOSIS — I48.19 PERSISTENT ATRIAL FIBRILLATION (HCC): ICD-10-CM

## 2025-05-22 RX ORDER — DILTIAZEM HYDROCHLORIDE 180 MG/1
180 CAPSULE, COATED, EXTENDED RELEASE ORAL DAILY
Qty: 90 CAPSULE | Refills: 3 | Status: SHIPPED | OUTPATIENT
Start: 2025-05-22

## 2025-05-22 RX ORDER — APIXABAN 5 MG/1
5 TABLET, FILM COATED ORAL 2 TIMES DAILY
Qty: 180 TABLET | Refills: 3 | Status: SHIPPED | OUTPATIENT
Start: 2025-05-22

## 2025-05-22 NOTE — TELEPHONE ENCOUNTER
Requesting medication refill. Please approve or deny this request.    Rx requested:  Requested Prescriptions     Pending Prescriptions Disp Refills    dilTIAZem (CARDIZEM CD) 180 MG extended release capsule [Pharmacy Med Name: DilTIAZem CD CAP 180MG/24HR] 90 capsule 3     Sig: TAKE 1 CAPSULE BY MOUTH DAILY    ELIQUIS 5 MG TABS tablet [Pharmacy Med Name: Eliquis 5 MG Oral Tablet] 180 tablet 3     Sig: TAKE 1 TABLET BY MOUTH TWICE  DAILY         Last Office Visit:   05/13/2025      Next Visit Date:  Future Appointments   Date Time Provider Department Center   5/12/2026  8:30 AM Holiday, DO [facility] [place] [facility] Romero

## 2025-05-28 ENCOUNTER — HOSPITAL ENCOUNTER (OUTPATIENT)
Dept: CARDIOLOGY | Facility: HOSPITAL | Age: 75
Discharge: HOME | End: 2025-05-28
Payer: MEDICARE

## 2025-05-28 DIAGNOSIS — I49.5 SICK SINUS SYNDROME (MULTI): ICD-10-CM

## 2025-05-28 DIAGNOSIS — Z95.0 PACEMAKER: ICD-10-CM

## 2025-05-28 PROCEDURE — 93296 REM INTERROG EVL PM/IDS: CPT

## 2025-06-19 ENCOUNTER — HOSPITAL ENCOUNTER (OUTPATIENT)
Dept: CARDIOLOGY | Facility: CLINIC | Age: 75
Discharge: HOME | End: 2025-06-19
Payer: MEDICARE

## 2025-06-19 VITALS
WEIGHT: 315 LBS | HEIGHT: 68 IN | SYSTOLIC BLOOD PRESSURE: 139 MMHG | BODY MASS INDEX: 47.74 KG/M2 | DIASTOLIC BLOOD PRESSURE: 70 MMHG

## 2025-06-19 DIAGNOSIS — I49.5 SINOATRIAL NODE DYSFUNCTION (MULTI): ICD-10-CM

## 2025-06-19 DIAGNOSIS — I48.91 UNSPECIFIED ATRIAL FIBRILLATION (MULTI): ICD-10-CM

## 2025-06-19 DIAGNOSIS — I48.19 OTHER PERSISTENT ATRIAL FIBRILLATION: ICD-10-CM

## 2025-06-19 DIAGNOSIS — Z95.4 PRESENCE OF OTHER HEART-VALVE REPLACEMENT: ICD-10-CM

## 2025-06-19 DIAGNOSIS — Z95.0 CARDIAC PACEMAKER IN SITU: ICD-10-CM

## 2025-06-19 LAB
AORTIC VALVE MEAN GRADIENT: 14 MMHG
AORTIC VALVE PEAK VELOCITY: 2.52 M/S
AV PEAK GRADIENT: 25 MMHG
EJECTION FRACTION APICAL 4 CHAMBER: 61.2
EJECTION FRACTION: 62 %
LEFT ATRIUM VOLUME AREA LENGTH INDEX BSA: 49.8 ML/M2
LEFT VENTRICLE INTERNAL DIMENSION DIASTOLE: 3.27 CM (ref 3.5–6)
RIGHT VENTRICLE FREE WALL PEAK S': 0.8 CM/S
RIGHT VENTRICLE PEAK SYSTOLIC PRESSURE: 33 MMHG
TRICUSPID ANNULAR PLANE SYSTOLIC EXCURSION: 1.9 CM

## 2025-06-19 PROCEDURE — 93280 PM DEVICE PROGR EVAL DUAL: CPT

## 2025-06-19 PROCEDURE — 2500000004 HC RX 250 GENERAL PHARMACY W/ HCPCS (ALT 636 FOR OP/ED): Performed by: INTERNAL MEDICINE

## 2025-06-19 PROCEDURE — 93280 PM DEVICE PROGR EVAL DUAL: CPT | Performed by: INTERNAL MEDICINE

## 2025-06-19 PROCEDURE — C8929 TTE W OR WO FOL WCON,DOPPLER: HCPCS

## 2025-06-19 PROCEDURE — 93306 TTE W/DOPPLER COMPLETE: CPT | Performed by: INTERNAL MEDICINE

## 2025-06-19 RX ADMIN — PERFLUTREN 3 ML OF DILUTION: 6.52 INJECTION, SUSPENSION INTRAVENOUS at 09:47

## 2025-07-23 DIAGNOSIS — I10 ESSENTIAL HYPERTENSION: ICD-10-CM

## 2025-07-23 DIAGNOSIS — I50.42 CHRONIC COMBINED SYSTOLIC AND DIASTOLIC CONGESTIVE HEART FAILURE: ICD-10-CM

## 2025-07-25 RX ORDER — IRBESARTAN 150 MG/1
150 TABLET ORAL DAILY
Qty: 90 TABLET | Refills: 3 | Status: SHIPPED | OUTPATIENT
Start: 2025-07-25

## 2025-07-25 RX ORDER — TERAZOSIN 10 MG/1
10 CAPSULE ORAL NIGHTLY
Qty: 90 CAPSULE | Refills: 3 | Status: SHIPPED | OUTPATIENT
Start: 2025-07-25

## 2025-08-14 DIAGNOSIS — I48.91 ATRIAL FIBRILLATION AND FLUTTER (HCC): ICD-10-CM

## 2025-08-14 DIAGNOSIS — I10 ESSENTIAL HYPERTENSION: ICD-10-CM

## 2025-08-14 DIAGNOSIS — I48.92 ATRIAL FIBRILLATION AND FLUTTER (HCC): ICD-10-CM

## 2025-08-15 RX ORDER — METOPROLOL TARTRATE 50 MG
50 TABLET ORAL 2 TIMES DAILY
Qty: 180 TABLET | Refills: 3 | Status: SHIPPED | OUTPATIENT
Start: 2025-08-15

## 2025-08-18 ENCOUNTER — APPOINTMENT (OUTPATIENT)
Dept: CARDIOLOGY | Facility: HOSPITAL | Age: 75
End: 2025-08-18
Payer: MEDICARE

## 2025-09-04 ENCOUNTER — HOSPITAL ENCOUNTER (OUTPATIENT)
Dept: CARDIOLOGY | Facility: HOSPITAL | Age: 75
Discharge: HOME | End: 2025-09-04
Payer: MEDICARE

## 2025-09-04 DIAGNOSIS — Z95.0 PACEMAKER: ICD-10-CM

## 2025-09-04 DIAGNOSIS — I49.5 SICK SINUS SYNDROME (MULTI): ICD-10-CM

## 2025-09-04 PROCEDURE — 93296 REM INTERROG EVL PM/IDS: CPT

## 2025-10-07 ENCOUNTER — APPOINTMENT (OUTPATIENT)
Dept: PRIMARY CARE | Facility: CLINIC | Age: 75
End: 2025-10-07
Payer: MEDICARE

## 2026-04-23 ENCOUNTER — APPOINTMENT (OUTPATIENT)
Dept: PRIMARY CARE | Facility: CLINIC | Age: 76
End: 2026-04-23
Payer: MEDICARE